# Patient Record
Sex: MALE | Race: WHITE | NOT HISPANIC OR LATINO | Employment: FULL TIME | URBAN - METROPOLITAN AREA
[De-identification: names, ages, dates, MRNs, and addresses within clinical notes are randomized per-mention and may not be internally consistent; named-entity substitution may affect disease eponyms.]

---

## 2018-04-24 ENCOUNTER — RECORDS - HEALTHEAST (OUTPATIENT)
Dept: LAB | Facility: CLINIC | Age: 58
End: 2018-04-24

## 2018-04-25 LAB
ALBUMIN SERPL-MCNC: 4.2 G/DL (ref 3.5–5)
ALP SERPL-CCNC: 78 U/L (ref 45–120)
ALT SERPL W P-5'-P-CCNC: 30 U/L (ref 0–45)
ANION GAP SERPL CALCULATED.3IONS-SCNC: 14 MMOL/L (ref 5–18)
AST SERPL W P-5'-P-CCNC: 31 U/L (ref 0–40)
BASOPHILS # BLD AUTO: 0.1 THOU/UL (ref 0–0.2)
BASOPHILS NFR BLD AUTO: 1 % (ref 0–2)
BILIRUB SERPL-MCNC: 1 MG/DL (ref 0–1)
BUN SERPL-MCNC: 13 MG/DL (ref 8–22)
CALCIUM SERPL-MCNC: 9.1 MG/DL (ref 8.5–10.5)
CHLORIDE BLD-SCNC: 106 MMOL/L (ref 98–107)
CHOLEST SERPL-MCNC: 189 MG/DL
CO2 SERPL-SCNC: 21 MMOL/L (ref 22–31)
CREAT SERPL-MCNC: 0.85 MG/DL (ref 0.7–1.3)
EOSINOPHIL # BLD AUTO: 0.2 THOU/UL (ref 0–0.4)
EOSINOPHIL NFR BLD AUTO: 4 % (ref 0–6)
ERYTHROCYTE [DISTWIDTH] IN BLOOD BY AUTOMATED COUNT: 12.1 % (ref 11–14.5)
FASTING STATUS PATIENT QL REPORTED: ABNORMAL
GFR SERPL CREATININE-BSD FRML MDRD: >60 ML/MIN/1.73M2
GLUCOSE BLD-MCNC: 73 MG/DL (ref 70–125)
HCT VFR BLD AUTO: 46.9 % (ref 40–54)
HDLC SERPL-MCNC: 40 MG/DL
HGB BLD-MCNC: 15.8 G/DL (ref 14–18)
LDLC SERPL CALC-MCNC: 112 MG/DL
LYMPHOCYTES # BLD AUTO: 2 THOU/UL (ref 0.8–4.4)
LYMPHOCYTES NFR BLD AUTO: 31 % (ref 20–40)
MCH RBC QN AUTO: 31.9 PG (ref 27–34)
MCHC RBC AUTO-ENTMCNC: 33.7 G/DL (ref 32–36)
MCV RBC AUTO: 95 FL (ref 80–100)
MONOCYTES # BLD AUTO: 0.6 THOU/UL (ref 0–0.9)
MONOCYTES NFR BLD AUTO: 10 % (ref 2–10)
NEUTROPHILS # BLD AUTO: 3.4 THOU/UL (ref 2–7.7)
NEUTROPHILS NFR BLD AUTO: 54 % (ref 50–70)
PLATELET # BLD AUTO: 254 THOU/UL (ref 140–440)
PMV BLD AUTO: 10.5 FL (ref 8.5–12.5)
POTASSIUM BLD-SCNC: 4 MMOL/L (ref 3.5–5)
PROT SERPL-MCNC: 7 G/DL (ref 6–8)
RBC # BLD AUTO: 4.96 MILL/UL (ref 4.4–6.2)
SODIUM SERPL-SCNC: 141 MMOL/L (ref 136–145)
TRIGL SERPL-MCNC: 187 MG/DL
TSH SERPL DL<=0.005 MIU/L-ACNC: 0.73 UIU/ML (ref 0.3–5)
WBC: 6.3 THOU/UL (ref 4–11)

## 2019-11-15 ENCOUNTER — RECORDS - HEALTHEAST (OUTPATIENT)
Dept: LAB | Facility: CLINIC | Age: 59
End: 2019-11-15

## 2019-11-15 LAB
ALBUMIN SERPL-MCNC: 4.2 G/DL (ref 3.5–5)
ALP SERPL-CCNC: 83 U/L (ref 45–120)
ALT SERPL W P-5'-P-CCNC: 33 U/L (ref 0–45)
ANION GAP SERPL CALCULATED.3IONS-SCNC: 10 MMOL/L (ref 5–18)
AST SERPL W P-5'-P-CCNC: 28 U/L (ref 0–40)
BILIRUB SERPL-MCNC: 1.1 MG/DL (ref 0–1)
BUN SERPL-MCNC: 17 MG/DL (ref 8–22)
CALCIUM SERPL-MCNC: 9 MG/DL (ref 8.5–10.5)
CHLORIDE BLD-SCNC: 108 MMOL/L (ref 98–107)
CHOLEST SERPL-MCNC: 200 MG/DL
CO2 SERPL-SCNC: 22 MMOL/L (ref 22–31)
CREAT SERPL-MCNC: 0.95 MG/DL (ref 0.7–1.3)
FASTING STATUS PATIENT QL REPORTED: ABNORMAL
GFR SERPL CREATININE-BSD FRML MDRD: >60 ML/MIN/1.73M2
GLUCOSE BLD-MCNC: 156 MG/DL (ref 70–125)
HDLC SERPL-MCNC: 42 MG/DL
LDLC SERPL CALC-MCNC: 127 MG/DL
POTASSIUM BLD-SCNC: 3.8 MMOL/L (ref 3.5–5)
PROT SERPL-MCNC: 6.7 G/DL (ref 6–8)
PSA SERPL-MCNC: 0.3 NG/ML (ref 0–3.5)
SODIUM SERPL-SCNC: 140 MMOL/L (ref 136–145)
TRIGL SERPL-MCNC: 155 MG/DL
TSH SERPL DL<=0.005 MIU/L-ACNC: 0.49 UIU/ML (ref 0.3–5)

## 2019-11-22 ENCOUNTER — RECORDS - HEALTHEAST (OUTPATIENT)
Dept: LAB | Facility: CLINIC | Age: 59
End: 2019-11-22

## 2019-11-24 LAB — HBA1C MFR BLD: 5.3 % (ref 4.2–6.1)

## 2020-11-16 ENCOUNTER — RECORDS - HEALTHEAST (OUTPATIENT)
Dept: LAB | Facility: CLINIC | Age: 60
End: 2020-11-16

## 2020-11-17 LAB
ALBUMIN SERPL-MCNC: 4.5 G/DL (ref 3.5–5)
ALP SERPL-CCNC: 99 U/L (ref 45–120)
ALT SERPL W P-5'-P-CCNC: 35 U/L (ref 0–45)
ANION GAP SERPL CALCULATED.3IONS-SCNC: 11 MMOL/L (ref 5–18)
AST SERPL W P-5'-P-CCNC: 30 U/L (ref 0–40)
BILIRUB SERPL-MCNC: 0.7 MG/DL (ref 0–1)
BUN SERPL-MCNC: 17 MG/DL (ref 8–22)
CALCIUM SERPL-MCNC: 9.1 MG/DL (ref 8.5–10.5)
CHLORIDE BLD-SCNC: 109 MMOL/L (ref 98–107)
CHOLEST SERPL-MCNC: 203 MG/DL
CO2 SERPL-SCNC: 24 MMOL/L (ref 22–31)
CREAT SERPL-MCNC: 1.03 MG/DL (ref 0.7–1.3)
FASTING STATUS PATIENT QL REPORTED: NO
GFR SERPL CREATININE-BSD FRML MDRD: >60 ML/MIN/1.73M2
GLUCOSE BLD-MCNC: 79 MG/DL (ref 70–125)
HDLC SERPL-MCNC: 39 MG/DL
LDLC SERPL CALC-MCNC: 144 MG/DL
LDLC SERPL CALC-MCNC: ABNORMAL MG/DL
POTASSIUM BLD-SCNC: 4.2 MMOL/L (ref 3.5–5)
PROT SERPL-MCNC: 7.1 G/DL (ref 6–8)
PSA SERPL-MCNC: 0.3 NG/ML (ref 0–3.5)
SODIUM SERPL-SCNC: 144 MMOL/L (ref 136–145)
TRIGL SERPL-MCNC: 410 MG/DL

## 2021-05-28 ENCOUNTER — RECORDS - HEALTHEAST (OUTPATIENT)
Dept: ADMINISTRATIVE | Facility: CLINIC | Age: 61
End: 2021-05-28

## 2021-12-06 ENCOUNTER — APPOINTMENT (OUTPATIENT)
Dept: CT IMAGING | Facility: HOSPITAL | Age: 61
DRG: 177 | End: 2021-12-06
Attending: EMERGENCY MEDICINE
Payer: COMMERCIAL

## 2021-12-06 ENCOUNTER — HOSPITAL ENCOUNTER (INPATIENT)
Facility: HOSPITAL | Age: 61
LOS: 2 days | Discharge: ANOTHER HOSPITAL | DRG: 177 | End: 2021-12-08
Attending: EMERGENCY MEDICINE | Admitting: INTERNAL MEDICINE
Payer: COMMERCIAL

## 2021-12-06 DIAGNOSIS — U07.1 INFECTION DUE TO 2019 NOVEL CORONAVIRUS: ICD-10-CM

## 2021-12-06 DIAGNOSIS — R09.02 HYPOXIA: ICD-10-CM

## 2021-12-06 DIAGNOSIS — R06.02 SOB (SHORTNESS OF BREATH): ICD-10-CM

## 2021-12-06 LAB
ABO/RH(D): NORMAL
ALBUMIN SERPL-MCNC: 3 G/DL (ref 3.5–5)
ALP SERPL-CCNC: 47 U/L (ref 45–120)
ALT SERPL W P-5'-P-CCNC: 68 U/L (ref 0–45)
ANION GAP SERPL CALCULATED.3IONS-SCNC: 11 MMOL/L (ref 5–18)
APTT PPP: 36 SECONDS (ref 22–38)
AST SERPL W P-5'-P-CCNC: 96 U/L (ref 0–40)
BASOPHILS # BLD AUTO: 0 10E3/UL (ref 0–0.2)
BASOPHILS NFR BLD AUTO: 1 %
BILIRUB SERPL-MCNC: 0.9 MG/DL (ref 0–1)
BNP SERPL-MCNC: 31 PG/ML (ref 0–53)
BUN SERPL-MCNC: 21 MG/DL (ref 8–22)
C REACTIVE PROTEIN LHE: 7.1 MG/DL (ref 0–0.8)
CALCIUM SERPL-MCNC: 8.3 MG/DL (ref 8.5–10.5)
CHLORIDE BLD-SCNC: 96 MMOL/L (ref 98–107)
CK SERPL-CCNC: 389 U/L (ref 30–190)
CO2 SERPL-SCNC: 26 MMOL/L (ref 22–31)
CREAT SERPL-MCNC: 0.98 MG/DL (ref 0.7–1.3)
D DIMER PPP FEU-MCNC: 0.99 UG/ML FEU (ref 0–0.5)
EOSINOPHIL # BLD AUTO: 0 10E3/UL (ref 0–0.7)
EOSINOPHIL NFR BLD AUTO: 0 %
ERYTHROCYTE [DISTWIDTH] IN BLOOD BY AUTOMATED COUNT: 11.9 % (ref 10–15)
FIBRINOGEN PPP-MCNC: 493 MG/DL (ref 170–490)
FLUAV RNA SPEC QL NAA+PROBE: NEGATIVE
FLUBV RNA RESP QL NAA+PROBE: NEGATIVE
GFR SERPL CREATININE-BSD FRML MDRD: 83 ML/MIN/1.73M2
GLUCOSE BLD-MCNC: 97 MG/DL (ref 70–125)
HCT VFR BLD AUTO: 48 % (ref 40–53)
HGB BLD-MCNC: 16.8 G/DL (ref 13.3–17.7)
IMM GRANULOCYTES # BLD: 0 10E3/UL
IMM GRANULOCYTES NFR BLD: 1 %
INR PPP: 0.95 (ref 0.85–1.15)
LDH SERPL L TO P-CCNC: 738 U/L (ref 125–220)
LYMPHOCYTES # BLD AUTO: 0.6 10E3/UL (ref 0.8–5.3)
LYMPHOCYTES NFR BLD AUTO: 12 %
MCH RBC QN AUTO: 31.3 PG (ref 26.5–33)
MCHC RBC AUTO-ENTMCNC: 35 G/DL (ref 31.5–36.5)
MCV RBC AUTO: 90 FL (ref 78–100)
MONOCYTES # BLD AUTO: 0.4 10E3/UL (ref 0–1.3)
MONOCYTES NFR BLD AUTO: 7 %
NEUTROPHILS # BLD AUTO: 4.1 10E3/UL (ref 1.6–8.3)
NEUTROPHILS NFR BLD AUTO: 79 %
NRBC # BLD AUTO: 0 10E3/UL
NRBC BLD AUTO-RTO: 0 /100
PLAT MORPH BLD: ABNORMAL
PLATELET # BLD AUTO: ABNORMAL 10*3/UL
POTASSIUM BLD-SCNC: 3.7 MMOL/L (ref 3.5–5)
PROCALCITONIN SERPL-MCNC: 0.18 NG/ML (ref 0–0.49)
PROT SERPL-MCNC: 6.1 G/DL (ref 6–8)
RBC # BLD AUTO: 5.36 10E6/UL (ref 4.4–5.9)
RBC MORPH BLD: ABNORMAL
SARS-COV-2 RNA RESP QL NAA+PROBE: POSITIVE
SODIUM SERPL-SCNC: 133 MMOL/L (ref 136–145)
SPECIMEN EXPIRATION DATE: NORMAL
TROPONIN I SERPL-MCNC: 0.02 NG/ML (ref 0–0.29)
TROPONIN I SERPL-MCNC: 0.03 NG/ML (ref 0–0.29)
WBC # BLD AUTO: 5.2 10E3/UL (ref 4–11)

## 2021-12-06 PROCEDURE — XW0DXM6 INTRODUCTION OF BARICITINIB INTO MOUTH AND PHARYNX, EXTERNAL APPROACH, NEW TECHNOLOGY GROUP 6: ICD-10-PCS | Performed by: FAMILY MEDICINE

## 2021-12-06 PROCEDURE — 96372 THER/PROPH/DIAG INJ SC/IM: CPT

## 2021-12-06 PROCEDURE — 36415 COLL VENOUS BLD VENIPUNCTURE: CPT | Performed by: FAMILY MEDICINE

## 2021-12-06 PROCEDURE — 99285 EMERGENCY DEPT VISIT HI MDM: CPT | Mod: 25

## 2021-12-06 PROCEDURE — 96361 HYDRATE IV INFUSION ADD-ON: CPT

## 2021-12-06 PROCEDURE — 84145 PROCALCITONIN (PCT): CPT | Performed by: EMERGENCY MEDICINE

## 2021-12-06 PROCEDURE — 84484 ASSAY OF TROPONIN QUANT: CPT | Performed by: EMERGENCY MEDICINE

## 2021-12-06 PROCEDURE — 36415 COLL VENOUS BLD VENIPUNCTURE: CPT | Performed by: EMERGENCY MEDICINE

## 2021-12-06 PROCEDURE — 258N000003 HC RX IP 258 OP 636: Performed by: FAMILY MEDICINE

## 2021-12-06 PROCEDURE — 250N000013 HC RX MED GY IP 250 OP 250 PS 637: Performed by: FAMILY MEDICINE

## 2021-12-06 PROCEDURE — 86900 BLOOD TYPING SEROLOGIC ABO: CPT | Performed by: FAMILY MEDICINE

## 2021-12-06 PROCEDURE — 85730 THROMBOPLASTIN TIME PARTIAL: CPT | Performed by: FAMILY MEDICINE

## 2021-12-06 PROCEDURE — 120N000001 HC R&B MED SURG/OB

## 2021-12-06 PROCEDURE — 999N000157 HC STATISTIC RCP TIME EA 10 MIN

## 2021-12-06 PROCEDURE — 87636 SARSCOV2 & INF A&B AMP PRB: CPT | Performed by: EMERGENCY MEDICINE

## 2021-12-06 PROCEDURE — 272N000054 HC CANNULA HIGH FLOW, ADULT

## 2021-12-06 PROCEDURE — 80053 COMPREHEN METABOLIC PANEL: CPT | Performed by: EMERGENCY MEDICINE

## 2021-12-06 PROCEDURE — 250N000011 HC RX IP 250 OP 636: Performed by: EMERGENCY MEDICINE

## 2021-12-06 PROCEDURE — 96374 THER/PROPH/DIAG INJ IV PUSH: CPT | Mod: 59

## 2021-12-06 PROCEDURE — 250N000011 HC RX IP 250 OP 636: Performed by: INTERNAL MEDICINE

## 2021-12-06 PROCEDURE — 71275 CT ANGIOGRAPHY CHEST: CPT

## 2021-12-06 PROCEDURE — 85610 PROTHROMBIN TIME: CPT | Performed by: FAMILY MEDICINE

## 2021-12-06 PROCEDURE — 83615 LACTATE (LD) (LDH) ENZYME: CPT | Performed by: FAMILY MEDICINE

## 2021-12-06 PROCEDURE — 84484 ASSAY OF TROPONIN QUANT: CPT | Performed by: FAMILY MEDICINE

## 2021-12-06 PROCEDURE — 85384 FIBRINOGEN ACTIVITY: CPT | Performed by: FAMILY MEDICINE

## 2021-12-06 PROCEDURE — 999N000215 HC STATISTIC HFNC ADULT NON-CPAP

## 2021-12-06 PROCEDURE — 86141 C-REACTIVE PROTEIN HS: CPT | Performed by: EMERGENCY MEDICINE

## 2021-12-06 PROCEDURE — 85048 AUTOMATED LEUKOCYTE COUNT: CPT | Performed by: EMERGENCY MEDICINE

## 2021-12-06 PROCEDURE — C9803 HOPD COVID-19 SPEC COLLECT: HCPCS

## 2021-12-06 PROCEDURE — 82550 ASSAY OF CK (CPK): CPT | Performed by: FAMILY MEDICINE

## 2021-12-06 PROCEDURE — 99223 1ST HOSP IP/OBS HIGH 75: CPT | Performed by: FAMILY MEDICINE

## 2021-12-06 PROCEDURE — 93005 ELECTROCARDIOGRAM TRACING: CPT | Performed by: EMERGENCY MEDICINE

## 2021-12-06 PROCEDURE — 85379 FIBRIN DEGRADATION QUANT: CPT | Performed by: EMERGENCY MEDICINE

## 2021-12-06 PROCEDURE — 83880 ASSAY OF NATRIURETIC PEPTIDE: CPT | Performed by: EMERGENCY MEDICINE

## 2021-12-06 RX ORDER — ACETAMINOPHEN 325 MG/1
650 TABLET ORAL EVERY 6 HOURS PRN
Status: DISCONTINUED | OUTPATIENT
Start: 2021-12-06 | End: 2021-12-08 | Stop reason: HOSPADM

## 2021-12-06 RX ORDER — ACETAMINOPHEN 650 MG/1
650 SUPPOSITORY RECTAL EVERY 6 HOURS PRN
Status: DISCONTINUED | OUTPATIENT
Start: 2021-12-06 | End: 2021-12-08 | Stop reason: HOSPADM

## 2021-12-06 RX ORDER — IOPAMIDOL 755 MG/ML
100 INJECTION, SOLUTION INTRAVASCULAR ONCE
Status: COMPLETED | OUTPATIENT
Start: 2021-12-06 | End: 2021-12-06

## 2021-12-06 RX ORDER — ONDANSETRON 2 MG/ML
4 INJECTION INTRAMUSCULAR; INTRAVENOUS EVERY 6 HOURS PRN
Status: DISCONTINUED | OUTPATIENT
Start: 2021-12-06 | End: 2021-12-08 | Stop reason: HOSPADM

## 2021-12-06 RX ORDER — PROCHLORPERAZINE 25 MG
25 SUPPOSITORY, RECTAL RECTAL EVERY 12 HOURS PRN
Status: DISCONTINUED | OUTPATIENT
Start: 2021-12-06 | End: 2021-12-08 | Stop reason: HOSPADM

## 2021-12-06 RX ORDER — POLYETHYLENE GLYCOL 3350 17 G/17G
17 POWDER, FOR SOLUTION ORAL DAILY PRN
Status: DISCONTINUED | OUTPATIENT
Start: 2021-12-06 | End: 2021-12-08 | Stop reason: HOSPADM

## 2021-12-06 RX ORDER — ONDANSETRON 4 MG/1
4 TABLET, ORALLY DISINTEGRATING ORAL EVERY 6 HOURS PRN
Status: DISCONTINUED | OUTPATIENT
Start: 2021-12-06 | End: 2021-12-08 | Stop reason: HOSPADM

## 2021-12-06 RX ORDER — FAMOTIDINE 20 MG/1
20 TABLET, FILM COATED ORAL 2 TIMES DAILY
Status: DISCONTINUED | OUTPATIENT
Start: 2021-12-06 | End: 2021-12-07

## 2021-12-06 RX ORDER — LIDOCAINE 40 MG/G
CREAM TOPICAL
Status: DISCONTINUED | OUTPATIENT
Start: 2021-12-06 | End: 2021-12-08 | Stop reason: HOSPADM

## 2021-12-06 RX ORDER — DEXAMETHASONE SODIUM PHOSPHATE 10 MG/ML
6 INJECTION, SOLUTION INTRAMUSCULAR; INTRAVENOUS ONCE
Status: COMPLETED | OUTPATIENT
Start: 2021-12-06 | End: 2021-12-06

## 2021-12-06 RX ORDER — PROCHLORPERAZINE MALEATE 10 MG
10 TABLET ORAL EVERY 6 HOURS PRN
Status: DISCONTINUED | OUTPATIENT
Start: 2021-12-06 | End: 2021-12-08 | Stop reason: HOSPADM

## 2021-12-06 RX ADMIN — SODIUM CHLORIDE 500 ML: 9 INJECTION, SOLUTION INTRAVENOUS at 21:36

## 2021-12-06 RX ADMIN — IOPAMIDOL 100 ML: 755 INJECTION, SOLUTION INTRAVENOUS at 17:41

## 2021-12-06 RX ADMIN — ENOXAPARIN SODIUM 50 MG: 100 INJECTION SUBCUTANEOUS at 21:35

## 2021-12-06 RX ADMIN — FAMOTIDINE 20 MG: 20 TABLET, FILM COATED ORAL at 21:36

## 2021-12-06 RX ADMIN — BARICITINIB 4 MG: 2 TABLET, FILM COATED ORAL at 20:55

## 2021-12-06 RX ADMIN — DEXAMETHASONE SODIUM PHOSPHATE 6 MG: 10 INJECTION INTRAMUSCULAR; INTRAVENOUS at 14:07

## 2021-12-06 ASSESSMENT — ENCOUNTER SYMPTOMS
TROUBLE SWALLOWING: 0
COUGH: 1
DIARRHEA: 0
DYSURIA: 0
FEVER: 1
SHORTNESS OF BREATH: 1
NAUSEA: 0
VOMITING: 0
ABDOMINAL PAIN: 0

## 2021-12-06 ASSESSMENT — ACTIVITIES OF DAILY LIVING (ADL)
ADLS_ACUITY_SCORE: 8

## 2021-12-06 NOTE — ED PROVIDER NOTES
EMERGENCY DEPARTMENT ENCOUNTER      NAME: Juan June  AGE: 61 year old male  YOB: 1960  MRN: [id number]  EVALUATION DATE & TIME: 2021 12:39 PM    PCP: Monroe Izquierdo    ED PROVIDER: Sandhya Wagner M.D.        Chief Complaint   Patient presents with     Shortness of Breath     Covid Concern         FINAL IMPRESSION:    1. Infection due to 2019 novel coronavirus    2. Hypoxia    3. SOB (shortness of breath)            MEDICAL DECISION MAKIN year old male who denies any past medical history who presents emergency department with hypoxia in the setting of Covid.  He is not vaccinated.  Oxygen saturations in the 80s here upon arrival.  He is on high flow oxygen at 60% and self proning with sats in the mid to upper 90s.  Laboratories consistent with Covid.  CT scan pending at time of signout to my partner and awaiting disposition.  Decadron and remdesivir have been ordered.        ED COURSE:  12:49 PM  I met with the patient to gather history and perform my exam. ED course and treatment discussed.  Patient on 15 L by oxygen mask and satting 92-93%.  No respiratory distress.  We will continue to monitor his oxygen saturations.  Decadron ordered from triage.  We will see if he qualifies for remdesivir.  The meantime we will do CT imaging and plan for admission to the hospital.    3:37 PM  Patient does qualify for remdesivir and this has been ordered.  Patient signed out at change of shift awaiting to Dr. Cordero awaiting CT scan results and admission.  He is currently on high flow oxygen at 60% and had improvement in oxygenation with self proning.  I do not think that this represents rib fractures, allergic reaction, COPD exacerbation, asthma exacerbation, CHF, myocarditis, pericarditis, endocarditis, ACS, PE, ruptured AAA, pneumothorax, aortic dissection, bowel obstruction, or other such etiologies at this time.      COVID-19 PPE worn during patient evaluation:  Mask: n95 and  homemade masks  Eye Protection: goggles   Gown: reusable   Hair cover: yes  Face shield: yes   Patient wearing a mask: yes    At the conclusion of the encounter I discussed the results of all of the tests and the disposition. Their questions were answered. The patient (and any family present) acknowledged understanding and were agreeable with the care plan.        30 minutes of critical care time has been spent in direct patient care, laboratory review, review of previous medical records, and in discussion with admitting physician and consultant(s).  None of this time was spent in procedures.          CONSULTANTS:  none          MEDICATIONS GIVEN IN THE EMERGENCY:  Medications   remdesivir 200 mg in sodium chloride 0.9 % 250 mL intermittent infusion (has no administration in time range)     Followed by   0.9% sodium chloride BOLUS (has no administration in time range)   remdesivir 100 mg in sodium chloride 0.9 % 250 mL intermittent infusion (has no administration in time range)     And   0.9% sodium chloride BOLUS (has no administration in time range)   dexamethasone PF (DECADRON) injection 6 mg (6 mg Intravenous Given 12/6/21 1407)             NEW PRESCRIPTIONS STARTED AT TODAY'S ER VISIT     Medication List      There are no discharge medications for this visit.             CONDITION:  stable        DISPOSITION:  Pending admission         =================================================================  =================================================================    HPI    Patient information was obtained from: patient    Use of Intrepreter: N/A     Juan June is a 61 year old male who denies any past medical history who presents to the ER with complaints of SOB and COVID.    Patient reports that he tested positive for Covid 1 week ago.  Comes in with increasing shortness of breath, fevers, cough.  He has been checking her oxygen level at home and it was 85% on room air.  Found to have oxygen saturation  "of 85% here in triage and placed on oxygen and moved back to a room urgently.    He denies any actual chest pain, abdominal pain, vomiting, diarrhea, dysuria, hematuria.    He is not Covid vaccinated.    Denies any past medical history and states he last saw primary care about 1 year ago for routine physical.  Denies any daily medications.  He reports an allergy to penicillins for which she reported \"they made me sick as a child \".      REVIEW OF SYSTEMS  Review of Systems   Constitutional: Positive for fever.   HENT: Negative for trouble swallowing.    Respiratory: Positive for cough and shortness of breath.    Cardiovascular: Negative for chest pain.   Gastrointestinal: Negative for abdominal pain, diarrhea, nausea and vomiting.   Genitourinary: Negative for dysuria.   All other systems reviewed and are negative.        PAST MEDICAL HISTORY:  No past medical history on file.      PAST SURGICAL HISTORY:  No past surgical history on file.      CURRENT MEDICATIONS:    Prior to Admission medications    Medication Sig Start Date End Date Taking? Authorizing Provider   FLONASE INHA 50 MCG/DOSE NA 2 SPRAYS IN EACH NOSTRIL QD (Once per day)       IBUPROFEN 200 200 MG OR TABS 4 TABLETs   EVERY 4 TO 6 HOURS AS NEEDED       LIQUIBID 1200 1200 MG OR TB12 1 TABLET EVERY 12 HOURS AS NEEDED 4/21/03            ALLERGIES:  Allergies   Allergen Reactions     Penicillins      \"?\" as child         FAMILY HISTORY:  Family History   Problem Relation Age of Onset     Hypertension Mother         born 1937     Lipids Father         born 1933     Family History Negative Brother          SOCIAL HISTORY:  Social History     Socioeconomic History     Marital status: Single     Spouse name: Not on file     Number of children: Not on file     Years of education: Not on file     Highest education level: Not on file   Occupational History     Not on file   Tobacco Use     Smoking status: Former Smoker     Quit date: 4/21/1978     Years since " quittin.6     Smokeless tobacco: Not on file   Substance and Sexual Activity     Alcohol use: Yes     Comment: rare     Drug use: Not on file     Sexual activity: Not on file   Other Topics Concern     Not on file   Social History Narrative     Not on file     Social Determinants of Health     Financial Resource Strain: Not on file   Food Insecurity: Not on file   Transportation Needs: Not on file   Physical Activity: Not on file   Stress: Not on file   Social Connections: Not on file   Intimate Partner Violence: Not on file   Housing Stability: Not on file         VITALS:  Patient Vitals for the past 24 hrs:   BP Temp Temp src Pulse Resp SpO2 Weight   21 1534 119/67 -- -- 68 24 93 % --   21 1515 125/67 -- -- 59 20 94 % --   21 1500 107/67 -- -- 64 29 90 % --   21 1445 122/71 -- -- 63 -- -- --   21 1430 125/74 -- -- 62 19 90 % --   21 1415 118/67 -- -- 60 -- -- --   21 1400 122/70 -- -- 60 -- -- --   21 1345 122/69 -- -- 60 17 93 % --   21 1331 -- -- -- -- -- 93 % --   21 1330 121/76 -- -- 63 -- -- --   21 1315 123/78 -- -- 62 -- 90 % --   21 1300 124/72 -- -- 64 (!) 35 93 % --   21 1257 122/70 -- -- 60 (!) 35 93 % --   21 1253 -- -- -- -- -- 92 % --   21 1245 125/66 -- -- 61 24 (!) 88 % --   21 1244 -- -- -- 60 -- (!) 87 % --   21 1226 -- -- -- -- -- -- 90.7 kg (200 lb)   21 1218 121/69 98.2  F (36.8  C) Temporal 58 18 (!) 84 % --       Wt Readings from Last 3 Encounters:   21 90.7 kg (200 lb)   03 87.5 kg (193 lb)         PHYSICAL EXAM    Constitutional:  Well developed, Well nourished, NAD, GCS 15  HENT:  Normocephalic, Atraumatic, Bilateral external ears normal, Nose normal. Neck-  Normal range of motion, No tenderness, Supple, No stridor.   Eyes:  PERRL, EOMI, Conjunctiva normal, No discharge.  Respiratory:  Normal breath sounds, No respiratory distress, No wheezing, Speaks full sentences  easily. Does not appear to be in any respiratory distress. No cough.   Cardiovascular:  Normal heart rate, Regular rhythm, No murmurs, No rubs, No gallops. Chest wall nontender.   GI:  No excessive obesity.  Bowel sounds normal, Soft, No tenderness, No masses, No flank tenderness. No rebound or guarding.   : deferred  Musculoskeletal:  No cyanosis, No clubbing. Good range of motion in all major joints. No major deformities noted.   Integument:  Warm, Dry, No erythema, No rash.  No petechiae.   Neurologic:  Alert & oriented x 3, No focal deficits noted.   Psychiatric:  Affect normal, Cooperative         LAB:  All pertinent labs reviewed and interpreted.  Recent Results (from the past 24 hour(s))   Troponin I (now)    Collection Time: 12/06/21  1:03 PM   Result Value Ref Range    Troponin I 0.02 0.00 - 0.29 ng/mL   B-Type Natriuretic Peptide ([facility] Only)    Collection Time: 12/06/21  1:03 PM   Result Value Ref Range    BNP 31 0 - 53 pg/mL   D dimer quantitative    Collection Time: 12/06/21  1:03 PM   Result Value Ref Range    D-Dimer Quantitative 0.99 (H) 0.00 - 0.50 ug/mL FEU   Procalcitonin    Collection Time: 12/06/21  1:03 PM   Result Value Ref Range    Procalcitonin 0.18 0.00 - 0.49 ng/mL   CBC with platelets and differential    Collection Time: 12/06/21  1:03 PM   Result Value Ref Range    WBC Count 5.2 4.0 - 11.0 10e3/uL    RBC Count 5.36 4.40 - 5.90 10e6/uL    Hemoglobin 16.8 13.3 - 17.7 g/dL    Hematocrit 48.0 40.0 - 53.0 %    MCV 90 78 - 100 fL    MCH 31.3 26.5 - 33.0 pg    MCHC 35.0 31.5 - 36.5 g/dL    RDW 11.9 10.0 - 15.0 %    Platelet Count     RBC and Platelet Morphology    Collection Time: 12/06/21  1:03 PM   Result Value Ref Range    Platelet Assessment Platelets Clumped (A) Automated Count Confirmed. Platelet morphology is normal.    RBC Morphology Confirmed RBC Indices    Symptomatic Influenza A/B & SARS-CoV2 (COVID-19) Virus PCR Multiplex Nasopharyngeal    Collection Time: 12/06/21  1:05 PM     Specimen: Nasopharyngeal; Swab   Result Value Ref Range    Influenza A PCR Negative Negative    Influenza B PCR Negative Negative    SARS CoV2 PCR Positive (A) Negative   CRP inflammation    Collection Time: 12/06/21  2:07 PM   Result Value Ref Range    CRP 7.1 (H) 0.0-<0.8 mg/dL   Comprehensive metabolic panel    Collection Time: 12/06/21  2:57 PM   Result Value Ref Range    Sodium 133 (L) 136 - 145 mmol/L    Potassium 3.7 3.5 - 5.0 mmol/L    Chloride 96 (L) 98 - 107 mmol/L    Carbon Dioxide (CO2) 26 22 - 31 mmol/L    Anion Gap 11 5 - 18 mmol/L    Urea Nitrogen 21 8 - 22 mg/dL    Creatinine 0.98 0.70 - 1.30 mg/dL    Calcium 8.3 (L) 8.5 - 10.5 mg/dL    Glucose 97 70 - 125 mg/dL    Alkaline Phosphatase 47 45 - 120 U/L    AST 96 (H) 0 - 40 U/L    ALT 68 (H) 0 - 45 U/L    Protein Total 6.1 6.0 - 8.0 g/dL    Albumin 3.0 (L) 3.5 - 5.0 g/dL    Bilirubin Total 0.9 0.0 - 1.0 mg/dL    GFR Estimate 83 >60 mL/min/1.73m2       No results found for: ABORH        RADIOLOGY:  Reviewed all pertinent imaging. Please see official radiology report.    CT Chest Pulmonary Embolism w Contrast    (Results Pending)         EKG:    Performed at: 13:06p    Impression: Sinus bradycardia at 59 bpm.  Flipped T waves noted in lead III, aVR, and V1.  RI interval 144 ms, QRS 90 ms,  ms.  No prior EKGs to compare to.    I have independently reviewed and interpreted the EKG(s) documented above.        PROCEDURES:  None      Sandhya Wagner M.D. [facility]  Emergency Medicine and Medical Toxicology  Formerly [facility] EMERGENCY DEPARTMENT  [address] [id number] [phone]  Dept: [phone]           Sandhya Wagner MD  12/06/21 0059

## 2021-12-06 NOTE — ED PROVIDER NOTES
ED Triage Provider Note  [facility]  Encounter Date: Dec 6, 2021    History:  No chief complaint on file.    Juan June is a 61 year old male who presents to the ED with shortness of breath. Home oximeter reading 85 percent. COVID positive 7 days ago. Fever at home. Ibuprofen helps with fever (7AM). Symptoms started 10 days ago. No tobacco use or vaping.    Not vaccinated. Bought ivermectin has not taken.       Review of Systems:  fever    Exam:  There were no vitals taken for this visit.    Vitals: /69   Pulse 58   Temp 98.2  F (36.8  C) (Temporal)   Resp 18   SpO2 (!) 84%   General: Appears in no acute distress, awake, alert, interactive.  Eyes: Conjunctivae non-injected. Sclera anicteric.  HENT: Atraumatic.  Neck: Supple.  Respiratory/Chest: Respiration unlabored.  Abdomen: non distended  Musculoskeletal: Normal extremities. No edema or erythema.  Skin: Normal color. No rash or diaphoresis.  Neurologic: Face symmetric, moves all extremities spontaneously. Speech clear.  Psychiatric: Oriented to person, place, and time. Affect appropriate.      Medical Decision Making:  Patient arriving to the ED with problem as above. A medical screening exam was performed. orders initiated from Triage. Needs ER bed evaluation.       Galdino Joyner MD on 12/6/2021 at 12:17 PM       Galdino Joyner MD  12/06/21 6721

## 2021-12-06 NOTE — PROGRESS NOTES
Patient found on 15lpm Oxymask Sao2 88% Placed on high flow NC 50lpm and 50% Sao2 increased to 93%  Chuy Oneal, RT

## 2021-12-06 NOTE — PROGRESS NOTES
Patient increased to high flow NC 50lpm and 60% Patient self prone Sao2 increased to 95%  Chuy Oneal, RT

## 2021-12-06 NOTE — ED TRIAGE NOTES
Pt positive for COVID 1 week ago. Pt sat at home 85%. O2 in triage is 85% placed on nasal cannula. 94% on 2 L

## 2021-12-06 NOTE — ED NOTES
Received report from Pat MCNAMARA. Patient is self proning at this time on high flow NC. Sating at 92-93%. Needs assessed, none identified. Will continue to monitor.

## 2021-12-06 NOTE — ED PROVIDER NOTES
ED SIGNOUT  Date/Time:12/6/2021 3:48 PM    Patient signed out to me by my colleague, Sandhya Wagner MD.  Please see their note for complete history and physical. Plan to follow up on pending CT results     The creation of this record is based on the scribe s observations of the work being performed by Misael Mcclain and the provider s statements to them. It was created on their behalf by Misael Mcclain a trained medical scribe. This document has been checked and approved by the attending provider.      REMAINING ED WORKUP:    Vitals:  /67   Pulse 68   Temp 98.2  F (36.8  C) (Temporal)   Resp 24   Wt 90.7 kg (200 lb)   SpO2 93%       Pertinent labs results reviewed   Results for orders placed or performed during the hospital encounter of 12/06/21   CT Chest Pulmonary Embolism w Contrast    Impression    IMPRESSION:  1.  Limited exam due to respiratory motion. No central or definite peripheral pulmonary artery embolism.  2.  Moderate to severe bilateral multilobar airspace disease consistent with COVID 19 pneumonia. No pleural effusion.   Comprehensive metabolic panel   Result Value Ref Range    Sodium 133 (L) 136 - 145 mmol/L    Potassium 3.7 3.5 - 5.0 mmol/L    Chloride 96 (L) 98 - 107 mmol/L    Carbon Dioxide (CO2) 26 22 - 31 mmol/L    Anion Gap 11 5 - 18 mmol/L    Urea Nitrogen 21 8 - 22 mg/dL    Creatinine 0.98 0.70 - 1.30 mg/dL    Calcium 8.3 (L) 8.5 - 10.5 mg/dL    Glucose 97 70 - 125 mg/dL    Alkaline Phosphatase 47 45 - 120 U/L    AST 96 (H) 0 - 40 U/L    ALT 68 (H) 0 - 45 U/L    Protein Total 6.1 6.0 - 8.0 g/dL    Albumin 3.0 (L) 3.5 - 5.0 g/dL    Bilirubin Total 0.9 0.0 - 1.0 mg/dL    GFR Estimate 83 >60 mL/min/1.73m2   Troponin I (now)   Result Value Ref Range    Troponin I 0.02 0.00 - 0.29 ng/mL   B-Type Natriuretic Peptide ([facility] Only)   Result Value Ref Range    BNP 31 0 - 53 pg/mL   D dimer quantitative   Result Value Ref Range    D-Dimer Quantitative 0.99 (H) 0.00 - 0.50 ug/mL [facility]    Result Value Ref Range    Procalcitonin 0.18 0.00 - 0.49 ng/mL   CBC with platelets and differential   Result Value Ref Range    WBC Count 5.2 4.0 - 11.0 10e3/uL    RBC Count 5.36 4.40 - 5.90 10e6/uL    Hemoglobin 16.8 13.3 - 17.7 g/dL    Hematocrit 48.0 40.0 - 53.0 %    MCV 90 78 - 100 fL    MCH 31.3 26.5 - 33.0 pg    MCHC 35.0 31.5 - 36.5 g/dL    RDW 11.9 10.0 - 15.0 %    Platelet Count     Symptomatic Influenza A/B & SARS-CoV2 (COVID-19) Virus PCR Multiplex Nasopharyngeal    Specimen: Nasopharyngeal; Swab   Result Value Ref Range    Influenza A PCR Negative Negative    Influenza B PCR Negative Negative    SARS CoV2 PCR Positive (A) Negative   CRP inflammation   Result Value Ref Range    CRP 7.1 (H) 0.0-<0.8 mg/dL   RBC and Platelet Morphology   Result Value Ref Range    Platelet Assessment Platelets Clumped (A) Automated Count Confirmed. Platelet morphology is normal.    RBC Morphology Confirmed RBC Indices        Pertinent imaging reviewed   Please see official radiology report.  CT Chest Pulmonary Embolism w Contrast   Final Result   IMPRESSION:   1.  Limited exam due to respiratory motion. No central or definite peripheral pulmonary artery embolism.   2.  Moderate to severe bilateral multilobar airspace disease consistent with COVID 19 pneumonia. No pleural effusion.           Interventions  Medications   remdesivir 200 mg in sodium chloride 0.9 % 250 mL intermittent infusion (has no administration in time range)     Followed by   0.9% sodium chloride BOLUS (has no administration in time range)   remdesivir 100 mg in sodium chloride 0.9 % 250 mL intermittent infusion (has no administration in time range)     And   0.9% sodium chloride BOLUS (has no administration in time range)   dexamethasone PF (DECADRON) injection 6 mg (6 mg Intravenous Given 12/6/21 6208)   iopamidol (ISOVUE-370) solution 100 mL (100 mLs Intravenous Given 12/6/21 5340)        ED Course/MDM:  3:29 PM Signout accepted from Dr. Wagner.   Prior records were reviewed.  Diagnostics from this visit are reviewed.  4:43 PM Spoke with Dr. Arias, hospitalist.   6:24 PM Exam benign.  Discussed CT imaging findings and again recommended administration of remdesivir.    Labs showed no acute concerning findings, troponin negative and ECG nonischemic.  At time of signout CT imaging of the chest was pending.  Again patient had already demonstrated hypoxia and was treated with dexamethasone and remdesivir, oxygen levels improved with supplemental oxygen and proning.  Covid test was positive here.  CT imaging returned and reported no pulmonary embolism but did report moderate to severe bilateral multilobar airspace disease consistent with COVID-19.  Patient was again treated with dexamethasone but he refused remdesivir stating that his physician had advised against using this particular medication.  On my exam the patient appeared quite well and comfortable and I again urged the patient to use the remdesivir but he deferred, I feel he has capacity and understands risk and benefits.  Patient will be admitted for continued care.  Discussed findings and care plan with the admitting service who is in agreement.           1. Infection due to 2019 novel coronavirus    2. Hypoxia    3. SOB (shortness of breath)          Randy Cordero MD  [facility] Emergency Department          Randy Cordero MD  12/06/21 6521

## 2021-12-07 PROBLEM — G47.33 OBSTRUCTIVE SLEEP APNEA SYNDROME: Status: ACTIVE | Noted: 2021-12-07

## 2021-12-07 PROBLEM — E78.2 MIXED HYPERLIPIDEMIA: Status: ACTIVE | Noted: 2021-12-07

## 2021-12-07 PROBLEM — J96.01 ACUTE RESPIRATORY FAILURE WITH HYPOXIA (H): Status: ACTIVE | Noted: 2021-12-07

## 2021-12-07 PROBLEM — K21.9 GASTROESOPHAGEAL REFLUX DISEASE: Status: ACTIVE | Noted: 2021-12-07

## 2021-12-07 LAB
ANION GAP SERPL CALCULATED.3IONS-SCNC: 13 MMOL/L (ref 5–18)
ATRIAL RATE - MUSE: 59 BPM
BUN SERPL-MCNC: 20 MG/DL (ref 8–22)
C REACTIVE PROTEIN LHE: 6.4 MG/DL (ref 0–0.8)
CALCIUM SERPL-MCNC: 8.5 MG/DL (ref 8.5–10.5)
CHLORIDE BLD-SCNC: 100 MMOL/L (ref 98–107)
CO2 SERPL-SCNC: 23 MMOL/L (ref 22–31)
CREAT SERPL-MCNC: 0.86 MG/DL (ref 0.7–1.3)
D DIMER PPP FEU-MCNC: 0.75 UG/ML FEU (ref 0–0.5)
DIASTOLIC BLOOD PRESSURE - MUSE: 72 MMHG
ERYTHROCYTE [DISTWIDTH] IN BLOOD BY AUTOMATED COUNT: 11.8 % (ref 10–15)
FIBRINOGEN PPP-MCNC: 521 MG/DL (ref 170–490)
GFR SERPL CREATININE-BSD FRML MDRD: >90 ML/MIN/1.73M2
GLUCOSE BLD-MCNC: 116 MG/DL (ref 70–125)
HCT VFR BLD AUTO: 44.3 % (ref 40–53)
HGB BLD-MCNC: 15.5 G/DL (ref 13.3–17.7)
INTERPRETATION ECG - MUSE: NORMAL
MCH RBC QN AUTO: 31.8 PG (ref 26.5–33)
MCHC RBC AUTO-ENTMCNC: 35 G/DL (ref 31.5–36.5)
MCV RBC AUTO: 91 FL (ref 78–100)
P AXIS - MUSE: 30 DEGREES
PLATELET # BLD AUTO: 172 10E3/UL (ref 150–450)
POTASSIUM BLD-SCNC: 4.7 MMOL/L (ref 3.5–5)
PR INTERVAL - MUSE: 144 MS
QRS DURATION - MUSE: 90 MS
QT - MUSE: 402 MS
QTC - MUSE: 397 MS
R AXIS - MUSE: 41 DEGREES
RBC # BLD AUTO: 4.88 10E6/UL (ref 4.4–5.9)
SODIUM SERPL-SCNC: 136 MMOL/L (ref 136–145)
SYSTOLIC BLOOD PRESSURE - MUSE: 124 MMHG
T AXIS - MUSE: -5 DEGREES
VENTRICULAR RATE- MUSE: 59 BPM
WBC # BLD AUTO: 4.2 10E3/UL (ref 4–11)

## 2021-12-07 PROCEDURE — 86141 C-REACTIVE PROTEIN HS: CPT | Performed by: FAMILY MEDICINE

## 2021-12-07 PROCEDURE — 80048 BASIC METABOLIC PNL TOTAL CA: CPT | Performed by: FAMILY MEDICINE

## 2021-12-07 PROCEDURE — 250N000011 HC RX IP 250 OP 636: Performed by: INTERNAL MEDICINE

## 2021-12-07 PROCEDURE — 85027 COMPLETE CBC AUTOMATED: CPT | Performed by: FAMILY MEDICINE

## 2021-12-07 PROCEDURE — 96372 THER/PROPH/DIAG INJ SC/IM: CPT

## 2021-12-07 PROCEDURE — 250N000012 HC RX MED GY IP 250 OP 636 PS 637: Performed by: FAMILY MEDICINE

## 2021-12-07 PROCEDURE — 120N000001 HC R&B MED SURG/OB

## 2021-12-07 PROCEDURE — 250N000013 HC RX MED GY IP 250 OP 250 PS 637: Performed by: INTERNAL MEDICINE

## 2021-12-07 PROCEDURE — 85384 FIBRINOGEN ACTIVITY: CPT | Performed by: FAMILY MEDICINE

## 2021-12-07 PROCEDURE — 999N000215 HC STATISTIC HFNC ADULT NON-CPAP

## 2021-12-07 PROCEDURE — 85379 FIBRIN DEGRADATION QUANT: CPT | Performed by: FAMILY MEDICINE

## 2021-12-07 PROCEDURE — 250N000013 HC RX MED GY IP 250 OP 250 PS 637: Performed by: FAMILY MEDICINE

## 2021-12-07 PROCEDURE — 99232 SBSQ HOSP IP/OBS MODERATE 35: CPT | Performed by: INTERNAL MEDICINE

## 2021-12-07 PROCEDURE — 36415 COLL VENOUS BLD VENIPUNCTURE: CPT | Performed by: FAMILY MEDICINE

## 2021-12-07 RX ORDER — PANTOPRAZOLE SODIUM 20 MG/1
40 TABLET, DELAYED RELEASE ORAL
Status: DISCONTINUED | OUTPATIENT
Start: 2021-12-07 | End: 2021-12-08 | Stop reason: HOSPADM

## 2021-12-07 RX ADMIN — FAMOTIDINE 20 MG: 20 TABLET, FILM COATED ORAL at 08:58

## 2021-12-07 RX ADMIN — PANTOPRAZOLE SODIUM 40 MG: 20 TABLET, DELAYED RELEASE ORAL at 18:06

## 2021-12-07 RX ADMIN — BARICITINIB 4 MG: 2 TABLET, FILM COATED ORAL at 20:28

## 2021-12-07 RX ADMIN — ENOXAPARIN SODIUM 50 MG: 100 INJECTION SUBCUTANEOUS at 20:28

## 2021-12-07 RX ADMIN — DEXAMETHASONE 6 MG: 4 TABLET ORAL at 12:32

## 2021-12-07 RX ADMIN — ENOXAPARIN SODIUM 50 MG: 100 INJECTION SUBCUTANEOUS at 08:57

## 2021-12-07 ASSESSMENT — ACTIVITIES OF DAILY LIVING (ADL)
ADLS_ACUITY_SCORE: 8
ADLS_ACUITY_SCORE: 8
ADLS_ACUITY_SCORE: 3
ADLS_ACUITY_SCORE: 8
WALKING_OR_CLIMBING_STAIRS_DIFFICULTY: NO
ADLS_ACUITY_SCORE: 8
ADLS_ACUITY_SCORE: 3
ADLS_ACUITY_SCORE: 8
DOING_ERRANDS_INDEPENDENTLY_DIFFICULTY: NO
ADLS_ACUITY_SCORE: 3
TOILETING_ISSUES: NO
ADLS_ACUITY_SCORE: 8
ADLS_ACUITY_SCORE: 3
DIFFICULTY_EATING/SWALLOWING: NO
DRESSING/BATHING_DIFFICULTY: NO
ADLS_ACUITY_SCORE: 8
ADLS_ACUITY_SCORE: 8
ADLS_ACUITY_SCORE: 3
CONCENTRATING,_REMEMBERING_OR_MAKING_DECISIONS_DIFFICULTY: NO
ADLS_ACUITY_SCORE: 8
PATIENT_/_FAMILY_COMMUNICATION_STYLE: SPOKEN LANGUAGE (ENGLISH OR BILINGUAL)
DIFFICULTY_COMMUNICATING: NO
ADLS_ACUITY_SCORE: 3
WEAR_GLASSES_OR_BLIND: NO
ADLS_ACUITY_SCORE: 8
ADLS_ACUITY_SCORE: 8
HEARING_DIFFICULTY_OR_DEAF: NO

## 2021-12-07 ASSESSMENT — MIFFLIN-ST. JEOR: SCORE: 1702.57

## 2021-12-07 NOTE — PROGRESS NOTES
D/w ED physician.       COVID patient on HFNC doing ok for now.      Rec:  ED has to contact SOC to place patient on transfer list for [facility] bed and [place]       Uncertain Causes of Abdominal Pain (Male)  Based on your visit today, the exact cause of your abdominal pain is not clear. Your exam and tests do not suggest a dangerous cause at this time. However, the signs of a serious problem may take more time to appear. Although your evaluation was reassuring today, sometimes early in the course of many conditions, exam and lab tests can appear normal. Therefore, it is important for you to watch for any new symptoms or worsening of your condition.  It may not be obvious what caused your symptoms. Pay attention to things that do seem to make your symptoms worse or better and discuss this with your doctor when you follow up.  The evaluation of abdominal pain in the emergency department may only require an exam by the doctor or it may include blood, urine or imaging studies, depending on many factors. Sometimes exams and tests can identify a cause but in many cases, a clear cause is not found. Further testing at follow up visits may help to suggest a clear diagnosis.  Home care  · Rest as much as you can until your next exam.  · Try to avoid any medicines (unless otherwise directed by your doctor), foods, activities, or other factors that may have contributed to your symptoms.  · Try to eat foods that you know that you have tolerated well in the past. Certain diets may be recommended for some conditions that cause abdominal pain. However, since the cause of your symptoms may not be clear, discuss your diet more with your healthcare provider or specialist for further recommendations.   · If you have diarrhea, it may help to avoid dairy (lactose) for the time being. A low fat, low fiber diet can also help.  · Eating several small meals per day as opposed to 2 or 3 larger meals may help.  · Avoid dehydration. Make sure to drink plenty of water. Other options include broth, soup, gelatin, sports drinks, or other clear liquids.  · Watch closely for anything that may make your  symptoms worse or better. Pay close attention to symptoms below that may mean your condition is getting worse.  Follow-up care  Follow up with your healthcare provider if your symptoms are not improving, or as advised. In some cases, you may need more testing.  When to seek medical advice  Call your healthcare provider right away if any of these occur:  · Pain is becoming worse  · You are unable to take your medicines or can't keep water down due to excessive vomiting  · Swelling of the abdomen  · Fever of 100.4ºF (38ºC) or higher, or as directed by your healthcare provider  · Blood in vomit or bowel movements (dark red or black color)  · Jaundice (yellow color of eyes and skin)  · New onset of weakness, dizziness or fainting  · New onset of chest, arm, back, neck or jaw pain  Date Last Reviewed: 12/30/2015  © 6527-4455 [employer]. [address] [place] [address]. All rights reserved. This information is not intended as a substitute for professional medical care. Always follow your healthcare professional's instructions.      Please return here or go to the Emergency Department for any concerns or worsening of condition.  If you were prescribed antibiotics, please take them to completion.  If you were prescribed a narcotic medication, do not drive or operate heavy equipment or machinery while taking these medications.  Please follow up with your primary care doctor or specialist as needed.    If you  smoke, please stop smoking.

## 2021-12-07 NOTE — PROGRESS NOTES
12/07/21 0448   Vital Signs   Resp 22   Pulse (!) 43   Oxygen Therapy   SpO2 90 %   O2 Device High Flow Nasal Cannula (HFNC)   FiO2 (%) 80 %   Oxygen Delivery 50 LPM

## 2021-12-07 NOTE — ED NOTES
Report received on patient, patient proning in room and sats WNL. In to introduce self. Lights dimmed and patient comfortable.

## 2021-12-07 NOTE — PROGRESS NOTES
Brief intensivist note  12/6/2021     Contacted by Dr. Kong to discuss Juan June. I am unable to leave the ICU to assess the patient in-person. We unfortunately do not have an ICU bed for this patient.     60 yo unvaccinated, previously healthy man w/ a recent COVID diagnosis, admitted 12/6 w/ acute hypoxic respiratory failure secondary to a severe COVID pneumonia. Initial SpO2 of 80% on RA in ED, rapidly progressed to requiring HFNC. Imaging negative for PE.    Recommendations/Plan:     Goal SpO2 >/= 90%, wean down supplemental oxygen to avoid hyperoxia (keep SpO2 90-94%)    Continue w/ HFNC as tolerated    If starts to require BiPAP, make strict NPO & strict bed rest (including commode/bathroom use)    Self prone for as long as tolerated, ideally for at least 16 hours/day    Maintain net-negative (or at least net-even) daily volume balance    COVID-19 specific therapies per [facility]/ED -- dexamethasone, remdesivir    Baricitinib discussed by [facility] w/ ID, started    Anticoagulation -- enoxaparin 0.5 mg/kg BID    Recommend PICC line placement as patient's rapid deterioration is concerning & he is at a high risk of intubation    Please, contact the Tele-ICU service in the morning (available 7a-7p) w/ ongoing critical medicine questions.   Please, contact the SOC to ensure appropriate placement if/when a bed becomes available w/in the system.    Heather Cheung MD  Pulmonary and Critical Care

## 2021-12-07 NOTE — ED NOTES
Pt. Requesting to have his meds back that he came in with so he can have his daughter pick them up so they dont get lost. Nurse notified.

## 2021-12-07 NOTE — ED NOTES
Patient has consitently remained at between 85-90 on high flow nasal cannula. Patient is noted to be asleep and is more mouth breathing at this time. ED RT notified. Will find an adapter mask to deliver oxygen.

## 2021-12-07 NOTE — PROGRESS NOTES
[facility]    Medicine Progress Note - Hospitalist Service    Date of Admission:  12/6/2021     Assessment & Plan   SUMMARY:  61 year old male with past medical history of dyslipidemia, EMILY (untreated), GERD, presented to the [facility] ER with shortness of breath and Covid positive x1 week.  Was 85% on room air at home.  Not vaccinated against COVID-19.    Calculate minimum 20 days from onset of symptoms for covid recovered date due to severity of disease  -ICU aware of patient, no ICU beds in house.   -Reported patient condition to Tele ICU on 12/7, called SOC and requested IMC/ICU bed  -12/7/2021: HFNC 50 liters, 80% oxygen    Anticipate >7 days of hospitalization    ACTIVE PROBLEM LIST  Acute hypoxic respiratory failure  COVID-19 pneumonia  -Rapidly worsening hypoxia 85% on presentation, increasing requirements to 50 L at 60% FiO2.  RT supervising proning and oxygen.  Unvaccinated.  -Tested + on 11/29  -CTA chest no evidence of PE, but does show bilateral lower lobe pneumonia consistent with moderate to severe COVID-19 infiltrates  -Dexamethasone 6 mg daily x10 days  -Remdesivir declined - patient states his belief in negative news regarding this medication.  -baricitanib 4mg daily x 14 days  -Covid-19 precautions  -Covid daily labs  -VTE ppx with lovenox - mid-level intensity due to severe Covid-19  -encourage proning  -Procalcitonin = 0.18  -BNP = 31  -troponin negative x 3  -Diet - full liquids due to respiratory failure    Hyponatremia  Resolved with IV bolus  monitor    Transaminitis/hepatitis  Likely due to Covid viral inflammation  Check fasting RUQ US tomorrow AM    Elevated hgb/hemoconcentration  Likely mild dehydration, has received some fluids  Plan to keep patient slightly hypovolemic  Renal function currently normal    EMILY  - oxygen, no CPAP while in hospital  - patient did not tolerate CPAP mask in the past, and has not used    GERD  - PPI while on  "dexamethasone    Dyslipidemia  - not currently under treatment    Clinically Significant Risk Factors Present on Admission                Diet: Orders Placed This Encounter      Full Liquid Diet    Correa Catheter: Not present  Central Lines/Port-a-cath: Not present  Drains: Not present     --------------------------------------------    The patient's care was discussed with the Patient.    Principal Problem:    Infection due to 2019 novel coronavirus  Active Problems:    SOB (shortness of breath)    Hypoxia    Gastroesophageal reflux disease without esophagitis    Mixed hyperlipidemia    Obstructive sleep apnea syndrome    Acute respiratory failure with hypoxia (H)      Chalino Sandy MD  Hospitalist Service  [facility]  Securely message with the Vocera Web Console (learn more here)  Text page via [employer] Paging/Directory    Clinically Significant Risk Factors Present on Admission             ______________________________________________________________________    Interval History   Patient still feels mildly short of breath on HFNC, especially when sitting up to use urinal     ROS: Denies chest pain and passing urine well    Data personally reviewed from the last 24 hours:   Radiology: CT reports  Reviewed telemetry, Laboratory results, Consultant recommendations and medications     Physical Exam   /66   Pulse 53   Temp 99.5  F (37.5  C) (Oral)   Resp 22   Ht 1.753 m (5' 9\")   Wt 90.7 kg (200 lb)   SpO2 96%   BMI 29.53 kg/m      Physical Exam    General Appearance:    HEENT:  Awake, Alert, Cooperative, in no distress and appears stated age   Normocephalic, atraumatic, conjunctiva clear without icterus and ears without discharge   Lungs:   Crackles at bases   Cardiovascular:  Regular Rate and Rythm, normal apical impulse, normal S1 and S2, no lower extremity edema bilaterally   Abdomen: Soft, non-tender and Non-distended, active bowel sounds   Skin:  Skin color, texture normal " and bruising or bleeding. No rashes or lesions over face, neck, arms and legs, turgor normal.   Neurologic:    Neuropsychiatric: Alert & Oriented X 3, Facial symmetry preserved and upper & lower extremities moving well with symmetry  Calm, normal eye contact, Affect normal     Data   Recent Labs   Lab 12/07/21  0830 12/06/21  1457 12/06/21  1303   WBC 4.2  --  5.2   HGB 15.5  --  16.8   MCV 91  --  90     --   --    INR  --   --  0.95    133*  --    POTASSIUM 4.7 3.7  --    CHLORIDE 100 96*  --    CO2 23 26  --    BUN 20 21  --    CR 0.86 0.98  --    ANIONGAP 13 11  --    AVERY 8.5 8.3*  --     97  --    ALBUMIN  --  3.0*  --    PROTTOTAL  --  6.1  --    BILITOTAL  --  0.9  --    ALKPHOS  --  47  --    ALT  --  68*  --    AST  --  96*  --      Recent Results (from the past 24 hour(s))   CT Chest Pulmonary Embolism w Contrast    Narrative    EXAM: CT CHEST PULMONARY EMBOLISM W CONTRAST  LOCATION: [facility]  DATE/TIME: 12/6/2021 5:27 PM    INDICATION: Shortness of breath, positive Covid 19, hypoxia  COMPARISON: None.  TECHNIQUE: CT chest pulmonary angiogram during arterial phase injection of IV contrast. Multiplanar reformats and MIP reconstructions were performed. Dose reduction techniques were used.   CONTRAST: isovue 370  100ml    FINDINGS:  ANGIOGRAM CHEST: Limited due to respiratory motion. No central or definite peripheral pulmonary artery embolism. Thoracic aorta is negative for dissection. No CT evidence of right heart strain.    LUNGS AND PLEURA: The central airways are clear. Moderate to severe bilateral multilobar groundglass and consolidative airspace opacities, right greater than left. No pleural effusion.    MEDIASTINUM/AXILLAE: Prominent likely reactive mediastinal and hilar lymph nodes. Upper normal heart size. No pericardial effusion. Tiny hiatal hernia.    CORONARY ARTERY CALCIFICATION: Mild.    UPPER ABDOMEN: Symmetric bilateral perinephric stranding is  likely chronic.    MUSCULOSKELETAL: Spinal degenerative changes.      Impression    IMPRESSION:  1.  Limited exam due to respiratory motion. No central or definite peripheral pulmonary artery embolism.  2.  Moderate to severe bilateral multilobar airspace disease consistent with COVID 19 pneumonia. No pleural effusion.

## 2021-12-07 NOTE — PLAN OF CARE
Problem: Gas Exchange Impaired  Goal: Optimal Gas Exchange  Outcome: No Change  Intervention: Optimize Oxygenation and Ventilation  Recent Flowsheet Documentation  Taken 12/7/2021 1244 by Di Eaton RN  Head of Bed (HOB) Positioning: HOB at 60 degrees  Taken 12/7/2021 0930 by Di Eaton RN  Head of Bed (HOB) Positioning: HOB flat  Taken 12/7/2021 0900 by Di Eaton RN  Head of Bed (HOB) Positioning: HOB flat    Continues on high flow mask 80% 50 liters. Verbal report given to critical care nurse Bernice MCNAMARA who will be assuming care.  Di Eaton RN

## 2021-12-07 NOTE — PROGRESS NOTES
12/06/21 2142   Vital Signs   Resp 17   Pulse 55   Oxygen Therapy   SpO2 94 %   O2 Device High Flow Nasal Cannula (HFNC)   FiO2 (%) 60 %   Oxygen Delivery 50 LPM

## 2021-12-07 NOTE — PHARMACY-ADMISSION MEDICATION HISTORY
Pharmacy Note - Admission Medication History    Pertinent Provider Information: Patient was prescribed ivermectin, azithromycin and methylprednisolone today. Only the ivermectin was taken this morning.     ______________________________________________________________________    Prior To Admission (PTA) med list completed and updated in EMR.       PTA Med List   Medication Sig Last Dose     omeprazole (PRILOSEC) 20 MG DR capsule Take 20 mg by mouth daily Past Week at Unknown time       Information source(s): Patient and CareEverywhere/SureScripts  Method of interview communication: phone    Summary of Changes to PTA Med List  New: Omeprazole  Discontinued: None  Changed: None    Patient was asked about OTC/herbal products specifically.  PTA med list reflects this.    In the past week, patient estimated taking medication this percent of the time:  50-90% due to illness.    Allergies were reviewed, assessed, and updated with the patient.      Patient does not use any multi-dose medications prior to admission.    The information provided in this note is only as accurate as the sources available at the time of the update(s).    Thank you for the opportunity to participate in the care of this patient.    Eleonora Corley [facility]  12/6/2021 7:01 PM

## 2021-12-07 NOTE — PROGRESS NOTES
Patient continues on high flow NC 50lpm with 80% Sao2 93%.  Encouraged patient to prone.  Chuy Oneal, RT

## 2021-12-07 NOTE — H&P
[facility]    History and Physical - Hospitalist Service       Date of Admission:  12/6/2021    Assessment & Plan      Patient is a 61-year-old male without any significant past medical history that presented to the ER with shortness of breath and Covid positive x1 week.  Was 85% on room air at home.  Not vaccinated against COVID-19.    Acute hypoxic respiratory failure  COVID-19 pneumonia  -Rapidly worsening hypoxia 85% on presentation, increasing requirements to 50 L at 60% FiO2.  RT working on proning and weaning oxygen.  Unvaccinated.  -Tested + on 11/29  -CTA chest no evidence of PE, but does show bilateral lower lobe pneumonia consistent with moderate to severe COVID-19 infiltrates  -Dexamethasone 6 mg daily x10 days  -Remdesivir declined  -baricitanib 4mg daily x 14 days  -Covid-19 precautions  -Covid daily labs  -VTE ppx with lovenox  -encourage proning  -ICU aware of patient, no ICU beds in house. Will need to call Tele ICU in the AM to check in [phone].       Diet: Combination Diet Regular Diet Adult    DVT Prophylaxis: Enoxaparin (Lovenox) SQ  Correa Catheter: Not present  Central Lines: None  Code Status: Full Code      Disposition Plan   Expected discharge: TBD depending on clinical course with Covid     The patient's care was discussed with the Patient.    Sky Kong MD  [facility]  Securely message with the Vocera Web Console (learn more here)  Text page via [employer] Paging/Directory        ______________________________________________________________________    Chief Complaint     Shortness of breath    History of Present Illness   Juan June is a  61-year-old male without any significant past medical history that presented to the ER with shortness of breath and Covid positive x1 week.  Was 85% on room air at home.  Not vaccinated against COVID-19.  Patient reports shortness of breath, trying to do proning which feels little bit  "better but does not tolerate well on his face.  Denies any fever, nausea, vomiting, diarrhea.  No other medical issues.  Says he is told people about his COVID-19 status of the get checked.  Does not want remdesivir.  Consents to [employer].      Review of Systems    The 10 point Review of Systems is negative other than noted in the HPI or here.     Past Medical History    No PMH    Past Surgical History   Denies any surgeries    Social History   I have reviewed this patient's social history and updated it with pertinent information if needed.  Social History     Tobacco Use     Smoking status: Former Smoker     Quit date: 1978     Years since quittin.6     Smokeless tobacco: Not on file   Substance Use Topics     Alcohol use: Yes     Comment: rare     Drug use: Not on file       Family History   I have reviewed this patient's family history and updated it with pertinent information if needed.  Family History   Problem Relation Age of Onset     Hypertension Mother         born 1937     Lipids Father         born 1933     Family History Negative Brother        Prior to Admission Medications   Prior to Admission Medications   Prescriptions Last Dose Informant Patient Reported? Taking?   FLONASE INHA 50 MCG/DOSE NA Not Taking at Unknown time  No No   Si SPRAYS IN EACH NOSTRIL QD (Once per day)   Patient not taking: Reported on 2021   IBUPROFEN 200 200 MG OR TABS Not Taking at Unknown time  No No   Si TABLETs   EVERY 4 TO 6 HOURS AS NEEDED   Patient not taking: Reported on 2021   LIQUIBID 1200 1200 MG OR TB12 Not Taking at Unknown time  No No   Si TABLET EVERY 12 HOURS AS NEEDED   Patient not taking: Reported on 2021   omeprazole (PRILOSEC) 20 MG DR capsule Past Week at Unknown time  Yes Yes   Sig: Take 20 mg by mouth daily      Facility-Administered Medications: None     Allergies   Allergies   Allergen Reactions     Penicillins      \"?\" as child       Physical Exam   Vital Signs: " Temp: 98.2  F (36.8  C) Temp src: Temporal BP: 119/67 Pulse: 68   Resp: 24 SpO2: 93 % O2 Device: High Flow Nasal Cannula (HFNC) Oxygen Delivery: 50 LPM  Weight: 200 lbs 0 oz    Physical Examination:   General appearance - alert, well appearing, and in no distress  Mental status - alert, oriented to person, place, and time, normal mood, behavior, speech, dress, motor activity, and thought processes  HEENT - sclera anicteric, left eye normal, right eye normal, nares normal and patent, no erythema, discharge or polyps and sinuses normal and nontender, mucous membranes moist, pharynx normal without lesions, dental hygiene good and tongue normal  Respiratory - diffuse coarse breath sounds  Cardiac - normal rate, regular rhythm, normal S1, S2, no murmurs  Abdomen - soft, nontender, nondistended  Neurological - alert, oriented, normal speech, no focal findings or movement disorder noted  Musculoskeletal - no joint tenderness, deformity or swelling, full range of motion without pain  Extremities - peripheral pulses normal, no pedal edema,   Skin - normal coloration and turgor, no rashes, no suspicious skin lesions noted      Data   Data reviewed today: I reviewed all medications, new labs and imaging results over the last 24 hours.     Recent Labs   Lab 12/06/21  1457 12/06/21  1303   WBC  --  5.2   HGB  --  16.8   MCV  --  90   INR  --  0.95   *  --    POTASSIUM 3.7  --    CHLORIDE 96*  --    CO2 26  --    BUN 21  --    CR 0.98  --    ANIONGAP 11  --    AVERY 8.3*  --    GLC 97  --    ALBUMIN 3.0*  --    PROTTOTAL 6.1  --    BILITOTAL 0.9  --    ALKPHOS 47  --    ALT 68*  --    AST 96*  --      Recent Results (from the past 24 hour(s))   CT Chest Pulmonary Embolism w Contrast    Narrative    EXAM: CT CHEST PULMONARY EMBOLISM W CONTRAST  LOCATION: [facility]  DATE/TIME: 12/6/2021 5:27 PM    INDICATION: Shortness of breath, positive Covid 19, hypoxia  COMPARISON: None.  TECHNIQUE: CT chest  pulmonary angiogram during arterial phase injection of IV contrast. Multiplanar reformats and MIP reconstructions were performed. Dose reduction techniques were used.   CONTRAST: isovue 370  100ml    FINDINGS:  ANGIOGRAM CHEST: Limited due to respiratory motion. No central or definite peripheral pulmonary artery embolism. Thoracic aorta is negative for dissection. No CT evidence of right heart strain.    LUNGS AND PLEURA: The central airways are clear. Moderate to severe bilateral multilobar groundglass and consolidative airspace opacities, right greater than left. No pleural effusion.    MEDIASTINUM/AXILLAE: Prominent likely reactive mediastinal and hilar lymph nodes. Upper normal heart size. No pericardial effusion. Tiny hiatal hernia.    CORONARY ARTERY CALCIFICATION: Mild.    UPPER ABDOMEN: Symmetric bilateral perinephric stranding is likely chronic.    MUSCULOSKELETAL: Spinal degenerative changes.      Impression    IMPRESSION:  1.  Limited exam due to respiratory motion. No central or definite peripheral pulmonary artery embolism.  2.  Moderate to severe bilateral multilobar airspace disease consistent with COVID 19 pneumonia. No pleural effusion.

## 2021-12-08 ENCOUNTER — HOSPITAL ENCOUNTER (INPATIENT)
Facility: CLINIC | Age: 61
LOS: 14 days | Discharge: HOME | DRG: 177 | End: 2021-12-22
Attending: ANESTHESIOLOGY | Admitting: ANESTHESIOLOGY
Payer: COMMERCIAL

## 2021-12-08 VITALS
DIASTOLIC BLOOD PRESSURE: 55 MMHG | WEIGHT: 200 LBS | TEMPERATURE: 98.9 F | RESPIRATION RATE: 26 BRPM | BODY MASS INDEX: 29.62 KG/M2 | SYSTOLIC BLOOD PRESSURE: 110 MMHG | OXYGEN SATURATION: 97 % | HEIGHT: 69 IN | HEART RATE: 48 BPM

## 2021-12-08 DIAGNOSIS — U07.1 INFECTION DUE TO 2019 NOVEL CORONAVIRUS: Primary | ICD-10-CM

## 2021-12-08 DIAGNOSIS — J96.01 ACUTE RESPIRATORY FAILURE WITH HYPOXIA (H): ICD-10-CM

## 2021-12-08 PROBLEM — H93.13 BILATERAL TINNITUS: Status: ACTIVE | Noted: 2021-12-08

## 2021-12-08 LAB
ALBUMIN SERPL-MCNC: 2.7 G/DL (ref 3.4–5)
ALP SERPL-CCNC: 51 U/L (ref 40–150)
ALT SERPL W P-5'-P-CCNC: 75 U/L (ref 0–70)
ANION GAP SERPL CALCULATED.3IONS-SCNC: 8 MMOL/L (ref 3–14)
AST SERPL W P-5'-P-CCNC: 89 U/L (ref 0–45)
BILIRUB SERPL-MCNC: 1.2 MG/DL (ref 0.2–1.3)
BUN SERPL-MCNC: 23 MG/DL (ref 7–30)
CALCIUM SERPL-MCNC: 8.5 MG/DL (ref 8.5–10.1)
CHLORIDE BLD-SCNC: 102 MMOL/L (ref 94–109)
CO2 SERPL-SCNC: 26 MMOL/L (ref 20–32)
CREAT SERPL-MCNC: 0.87 MG/DL (ref 0.66–1.25)
CRP SERPL-MCNC: 45 MG/L (ref 0–8)
ERYTHROCYTE [DISTWIDTH] IN BLOOD BY AUTOMATED COUNT: 11.9 % (ref 10–15)
GFR SERPL CREATININE-BSD FRML MDRD: >90 ML/MIN/1.73M2
GLUCOSE BLD-MCNC: 118 MG/DL (ref 70–99)
GLUCOSE BLDC GLUCOMTR-MCNC: 105 MG/DL (ref 70–99)
GLUCOSE BLDC GLUCOMTR-MCNC: 115 MG/DL (ref 70–99)
GLUCOSE BLDC GLUCOMTR-MCNC: 118 MG/DL (ref 70–99)
GLUCOSE BLDC GLUCOMTR-MCNC: 119 MG/DL (ref 70–99)
HCT VFR BLD AUTO: 46.9 % (ref 40–53)
HGB BLD-MCNC: 15.8 G/DL (ref 13.3–17.7)
MCH RBC QN AUTO: 31 PG (ref 26.5–33)
MCHC RBC AUTO-ENTMCNC: 33.7 G/DL (ref 31.5–36.5)
MCV RBC AUTO: 92 FL (ref 78–100)
PLATELET # BLD AUTO: 232 10E3/UL (ref 150–450)
POTASSIUM BLD-SCNC: 4.2 MMOL/L (ref 3.4–5.3)
PROCALCITONIN SERPL-MCNC: 0.07 NG/ML
PROT SERPL-MCNC: 6.8 G/DL (ref 6.8–8.8)
RBC # BLD AUTO: 5.09 10E6/UL (ref 4.4–5.9)
SODIUM SERPL-SCNC: 136 MMOL/L (ref 133–144)
WBC # BLD AUTO: 5.8 10E3/UL (ref 4–11)

## 2021-12-08 PROCEDURE — 999N000157 HC STATISTIC RCP TIME EA 10 MIN

## 2021-12-08 PROCEDURE — 200N000002 HC R&B ICU UMMC

## 2021-12-08 PROCEDURE — 250N000013 HC RX MED GY IP 250 OP 250 PS 637: Performed by: STUDENT IN AN ORGANIZED HEALTH CARE EDUCATION/TRAINING PROGRAM

## 2021-12-08 PROCEDURE — 94660 CPAP INITIATION&MGMT: CPT

## 2021-12-08 PROCEDURE — 5A09457 ASSISTANCE WITH RESPIRATORY VENTILATION, 24-96 CONSECUTIVE HOURS, CONTINUOUS POSITIVE AIRWAY PRESSURE: ICD-10-PCS | Performed by: INTERNAL MEDICINE

## 2021-12-08 PROCEDURE — 84145 PROCALCITONIN (PCT): CPT | Performed by: STUDENT IN AN ORGANIZED HEALTH CARE EDUCATION/TRAINING PROGRAM

## 2021-12-08 PROCEDURE — 86140 C-REACTIVE PROTEIN: CPT | Performed by: STUDENT IN AN ORGANIZED HEALTH CARE EDUCATION/TRAINING PROGRAM

## 2021-12-08 PROCEDURE — 250N000012 HC RX MED GY IP 250 OP 636 PS 637: Performed by: STUDENT IN AN ORGANIZED HEALTH CARE EDUCATION/TRAINING PROGRAM

## 2021-12-08 PROCEDURE — 250N000011 HC RX IP 250 OP 636: Performed by: STUDENT IN AN ORGANIZED HEALTH CARE EDUCATION/TRAINING PROGRAM

## 2021-12-08 PROCEDURE — 80053 COMPREHEN METABOLIC PANEL: CPT | Performed by: STUDENT IN AN ORGANIZED HEALTH CARE EDUCATION/TRAINING PROGRAM

## 2021-12-08 PROCEDURE — 85014 HEMATOCRIT: CPT | Performed by: STUDENT IN AN ORGANIZED HEALTH CARE EDUCATION/TRAINING PROGRAM

## 2021-12-08 PROCEDURE — XW0G7M6 INTRODUCTION OF BARICITINIB INTO UPPER GI, VIA NATURAL OR ARTIFICIAL OPENING, NEW TECHNOLOGY GROUP 6: ICD-10-PCS | Performed by: INTERNAL MEDICINE

## 2021-12-08 PROCEDURE — 36415 COLL VENOUS BLD VENIPUNCTURE: CPT | Performed by: STUDENT IN AN ORGANIZED HEALTH CARE EDUCATION/TRAINING PROGRAM

## 2021-12-08 RX ORDER — AZITHROMYCIN 500 MG/1
500 TABLET, FILM COATED ORAL DAILY
Status: ON HOLD | COMMUNITY
Start: 2021-12-06 | End: 2021-12-08

## 2021-12-08 RX ORDER — NICOTINE POLACRILEX 4 MG
15-30 LOZENGE BUCCAL
Status: DISCONTINUED | OUTPATIENT
Start: 2021-12-08 | End: 2021-12-10

## 2021-12-08 RX ORDER — METHYLPREDNISOLONE 4 MG
4 TABLET, DOSE PACK ORAL 2 TIMES DAILY
Status: ON HOLD | COMMUNITY
Start: 2021-12-06 | End: 2021-12-08

## 2021-12-08 RX ORDER — DEXTROSE MONOHYDRATE 25 G/50ML
25-50 INJECTION, SOLUTION INTRAVENOUS
Status: DISCONTINUED | OUTPATIENT
Start: 2021-12-08 | End: 2021-12-10

## 2021-12-08 RX ORDER — PANTOPRAZOLE SODIUM 40 MG/1
40 TABLET, DELAYED RELEASE ORAL
Status: DISCONTINUED | OUTPATIENT
Start: 2021-12-08 | End: 2021-12-22 | Stop reason: HOSPADM

## 2021-12-08 RX ORDER — ASPIRIN 81 MG/1
81 TABLET ORAL DAILY
Status: ON HOLD | COMMUNITY
End: 2021-12-08

## 2021-12-08 RX ADMIN — BARICITINIB 4 MG: 2 TABLET, FILM COATED ORAL at 08:33

## 2021-12-08 RX ADMIN — ENOXAPARIN SODIUM 50 MG: 60 INJECTION SUBCUTANEOUS at 08:35

## 2021-12-08 RX ADMIN — ENOXAPARIN SODIUM 50 MG: 60 INJECTION SUBCUTANEOUS at 19:49

## 2021-12-08 RX ADMIN — PANTOPRAZOLE SODIUM 40 MG: 40 TABLET, DELAYED RELEASE ORAL at 08:33

## 2021-12-08 RX ADMIN — DEXAMETHASONE 6 MG: 2 TABLET ORAL at 08:33

## 2021-12-08 ASSESSMENT — ACTIVITIES OF DAILY LIVING (ADL)
HEARING_DIFFICULTY_OR_DEAF: NO
WHICH_OF_THE_ABOVE_FUNCTIONAL_RISKS_HAD_A_RECENT_ONSET_OR_CHANGE?: EATING
ADLS_ACUITY_SCORE: 9
FALL_HISTORY_WITHIN_LAST_SIX_MONTHS: NO
ADLS_ACUITY_SCORE: 9
DRESSING/BATHING_DIFFICULTY: NO
DOING_ERRANDS_INDEPENDENTLY_DIFFICULTY: NO
PATIENT_/_FAMILY_COMMUNICATION_STYLE: SPOKEN LANGUAGE (ENGLISH OR BILINGUAL)
CONCENTRATING,_REMEMBERING_OR_MAKING_DECISIONS_DIFFICULTY: NO
ADLS_ACUITY_SCORE: 3
ADLS_ACUITY_SCORE: 5
ADLS_ACUITY_SCORE: 9
DIFFICULTY_EATING/SWALLOWING: NO
WEAR_GLASSES_OR_BLIND: NO
DIFFICULTY_COMMUNICATING: NO
ADLS_ACUITY_SCORE: 9
ADLS_ACUITY_SCORE: 18
ADLS_ACUITY_SCORE: 18
WALKING_OR_CLIMBING_STAIRS_DIFFICULTY: NO
ADLS_ACUITY_SCORE: 9
ADLS_ACUITY_SCORE: 3
ADLS_ACUITY_SCORE: 9
ADLS_ACUITY_SCORE: 9
ADLS_ACUITY_SCORE: 3
ADLS_ACUITY_SCORE: 12
ADLS_ACUITY_SCORE: 3
TOILETING_ISSUES: NO
ADLS_ACUITY_SCORE: 9

## 2021-12-08 NOTE — PHARMACY-ADMISSION MEDICATION HISTORY
Admission Medication History Completed by Pharmacy    See [facility] Admission Navigator for allergy information, preferred outpatient pharmacy, prior to admission medications and immunization status.     Medication History Sources:     Pharmacist medication history completed 12/6 at [facility]    Changes made to PTA medication list (reason):    Added: None    Deleted: Aspirin 81 mg, methylpred and azithromycin (prescribed but patient never started prior to admit)    Changed: None    Additional Information:  -Patient was started on ivermectin, azithromycin and methylprednisolone on 12/6.  Only fills at the pharmacy for azithromycin and methylprednisolone. Only the ivermectin was taken the morning of 12/6 per [facility] med history.    Prior to Admission medications    Medication Sig Last Dose Taking? Auth Provider   omeprazole (PRILOSEC) 20 MG DR capsule Take 20 mg by mouth daily  Yes Reported, Patient       Date completed: 12/08/21    Medication history completed by: Olga Hercules, PharmD, BCPS

## 2021-12-08 NOTE — DISCHARGE SUMMARY
[facility]  Hospitalist Discharge Summary      Date of Admission:  12/6/2021  Date of Discharge:  12/8/2021  Discharging Provider: Demario Crespo MD      Discharge Diagnoses   Acute respiratory failure with hypoxia  COVID-19 pneumonia  Hyponatremia  Transaminitis  EMILY  GERD  Dyslipidemia    Follow-ups Needed After Discharge   To be determined after discharge from [facility]    Unresulted Labs Ordered in the Past 30 Days of this Admission     No orders found from 11/6/2021 to 12/7/2021.      These results will be followed up: by N/A    Discharge Disposition   Discharged to [facility] ICU  Condition at discharge: Stable      Hospital Course      HPI:  Juan June is a  61-year-old male without any significant past medical history that presented to the ER with shortness of breath and Covid positive x1 week.  Was 85% on room air at home.  Not vaccinated against COVID-19.  Patient reports shortness of breath, trying to do proning which feels little bit better but does not tolerate well on his face.  Denies any fever, nausea, vomiting, diarrhea.  No other medical issues.  Says he is told people about his COVID-19 status of the get checked.  Does not want remdesivir.  Consents to barcitinib.      SUMMARY:  61 year old male with past medical history of dyslipidemia, EMILY (untreated), GERD, presented to the [facility] ER with shortness of breath and Covid positive x1 week.  Was 85% on room air at home.  Not vaccinated against COVID-19.     Calculate minimum 20 days from onset of symptoms for covid recovered date due to severity of disease  -ICU aware of patient, no ICU beds in house.   -Reported patient condition to Tele ICU on 12/7, called SOC and requested IMC/ICU bed  -12/7/2021: HFNC 50 liters, 80% oxygen     Anticipate >7 days of hospitalization     ACTIVE PROBLEM LIST  Acute hypoxic respiratory failure  COVID-19 pneumonia  -Rapidly worsening hypoxia 85% on presentation, increasing requirements to  50 L at 60% FiO2.  RT supervising proning and oxygen.  Unvaccinated.  -Tested + on 11/29  -CTA chest no evidence of PE, but does show bilateral lower lobe pneumonia consistent with moderate to severe COVID-19 infiltrates  -Dexamethasone 6 mg daily x10 days  -Remdesivir declined - patient states his belief in negative news regarding this medication.  -baricitanib 4mg daily x 14 days  -Covid-19 precautions  -Covid daily labs  -VTE ppx with lovenox - mid-level intensity due to severe Covid-19  -encourage proning  -Procalcitonin = 0.18  -BNP = 31  -troponin negative x 3  -Diet - full liquids due to respiratory failure     Hyponatremia  Resolved with IV bolus  monitor     Transaminitis/hepatitis  Likely due to Covid viral inflammation  Check fasting RUQ US tomorrow AM     Elevated hgb/hemoconcentration  Likely mild dehydration, has received some fluids  Plan to keep patient slightly hypovolemic  Renal function currently normal     EMILY  - oxygen, no CPAP while in hospital  - patient did not tolerate CPAP mask in the past, and has not used     GERD  - PPI while on dexamethasone     Dyslipidemia  - not currently under treatment    Addendum:  Patient is still boarding in the ED and self pronates, however has required high flow nasal cannula 60 L and FiO2 100%. He was noted with desaturations while asleep so as needed BiPAP is ordered. A bed became available at [facility] ICU so patient will transfer.    Consultations This Hospital Stay   None    Code Status   Full Code    Time Spent on this Encounter   I, Demario Crespo MD, personally spent less than or equal to 30 minutes discharging this patient.       Demario Crespo MD  [facility] EMERGENCY DEPARTMENT  [address] [place] [id number]  Phone: [phone]  ______________________________________________________________________    Physical Exam   Vital Signs: Temp: 98.9  F (37.2  C) Temp src: Oral BP: 98/58 Pulse: (!) 44   Resp: 26 SpO2:  93 % O2 Device: High Flow Nasal Cannula (HFNC) Oxygen Delivery: 60 LPM  Weight: 200 lbs 0 oz         Primary Care Physician   Monroe Izquierdo    Discharge Orders      Full Code       Significant Results and Procedures   Results for orders placed or performed during the hospital encounter of 12/06/21   CT Chest Pulmonary Embolism w Contrast    Narrative    EXAM: CT CHEST PULMONARY EMBOLISM W CONTRAST  LOCATION: [facility]  DATE/TIME: 12/6/2021 5:27 PM    INDICATION: Shortness of breath, positive Covid 19, hypoxia  COMPARISON: None.  TECHNIQUE: CT chest pulmonary angiogram during arterial phase injection of IV contrast. Multiplanar reformats and MIP reconstructions were performed. Dose reduction techniques were used.   CONTRAST: isovue 370  100ml    FINDINGS:  ANGIOGRAM CHEST: Limited due to respiratory motion. No central or definite peripheral pulmonary artery embolism. Thoracic aorta is negative for dissection. No CT evidence of right heart strain.    LUNGS AND PLEURA: The central airways are clear. Moderate to severe bilateral multilobar groundglass and consolidative airspace opacities, right greater than left. No pleural effusion.    MEDIASTINUM/AXILLAE: Prominent likely reactive mediastinal and hilar lymph nodes. Upper normal heart size. No pericardial effusion. Tiny hiatal hernia.    CORONARY ARTERY CALCIFICATION: Mild.    UPPER ABDOMEN: Symmetric bilateral perinephric stranding is likely chronic.    MUSCULOSKELETAL: Spinal degenerative changes.      Impression    IMPRESSION:  1.  Limited exam due to respiratory motion. No central or definite peripheral pulmonary artery embolism.  2.  Moderate to severe bilateral multilobar airspace disease consistent with COVID 19 pneumonia. No pleural effusion.       Discharge Medications   Current Discharge Medication List      STOP taking these medications       FLONASE INHA 50 MCG/DOSE NA Comments:   Reason for Stopping:         IBUPROFEN 200 200  "MG OR TABS Comments:   Reason for Stopping:         LIQUIBID 1200 1200 MG OR TB12 Comments:   Reason for Stopping:         omeprazole (PRILOSEC) 20 MG DR capsule Comments:   Reason for Stopping:             Allergies   Allergies   Allergen Reactions     Penicillins      \"?\" as child     "

## 2021-12-08 NOTE — PROGRESS NOTES
Report called to Vikki MCNAMARA at [facility] unit 4A for pt transfer.     Fabiola Carrington RN

## 2021-12-08 NOTE — PROGRESS NOTES
Please see H&P for the admission note, Patient remains stable fluctuating between BiPAP and HFNC, Patient is persistently bradycardic in 40's however no sings of inadequate perfusion with this. Will continue to follow and if he remains stable likely transfer out tomorrow    Critical Care Services Progress Note:     Juan June remains critically ill with hypoxic respiratory failure     I personally examined and evaluated the patient today.   The patient s prognosis today is unchanged  I have evaluated all laboratory values and imaging studies from the past 24 hours.    Juan Lisa MD

## 2021-12-08 NOTE — PROGRESS NOTES
"Pt being transferred to [facility] unit 4A via [facility] on high flow. All belongings sent with pt. Pts cellphone, , phone case, watch and eye glasses all in pt belongings bag with pts shoes. Pt declines for writer to notify his daughter of transfer stating he will \"do it myself\".      Fabiola Carrington RN    "

## 2021-12-08 NOTE — H&P
MEDICAL ICU H&P  12/08/2021    Date of Hospital Admission: 12/8/21  Date of ICU Admission: 12/8/21  Reason for Critical Care Admission: hypoxemic respiratory failure  Date of Service (when I saw the patient): 12/08/2021    ASSESSMENT: Juan June is a 61 year old male with PMH dyslipidemia, EMILY, GERD who was admitted on 12/8/2021 acute hypoxemic respiratory failure secondary to COVID19 PNA.    PLAN:    Neuro:  # Pain and sedation  None.     Pulmonary:  # Acute hypoxemic respiratory failure secondary to COVID19 PNA  Admitted to [facility] 12/6 for hypoxic respiratory failure. Transferred to [facility] 12/8 for increased O2 requirements. CTA chest 12/6 with no PE, mutlifocal opacities. Euvolemic on exam. Tolerating high flow well.   - Covid treatment per below  - encouraged self proning  - cont HFNC, wean if able    Cardiovascular:  No acute concerns. Euvolemic. EKG QTc 397.     GI/Nutrition:  # elevated transaminases  Monitor. No abdominal pain. Inflammatory in setting of COVID19.    Full liquid diet.    Renal/Fluids/Electrolytes:  No acute concerns. Cr at baseline.    Endocrine:  No acute concerns.   Low dose ssi while on steroids.  Hypoglycemia protocol    ID:  # COVID19 PNA  Positive 12/6/21. CT chest imaging consistent with multifocal viral PNA. Elevated inflammatory markers and rising. CRP 45, , fibrinogen 521, ddimer 0.75. No signs or symptoms consistent with superimposed bacterial PNA at this time.  - Dexamethasone (12/6 -  12/15)  - Baricitinib 4 mg daily  - anticoag: enoxaparin 0.5 mg/kg BID    Hematology:    No active concerns    Musculoskeletal:  No active concerns    Skin:  No active concerns    General Cares/Prophylaxis:    DVT Prophylaxis: Enoxaparin (Lovenox) SQ  GI Prophylaxis: PPI  Restraints: none  Code Status: FULL    Lines/tubes/drains:  - PIV x2    Disposition:  - Medical ICU     Patient seen and findings/plan discussed with medical ICU staff, Dr. Munoz.    Juan Gipson  [facility]    -----------------------------------------------------------------------    HISTORY PRESENTING ILLNESS:     Transferred from [facility] for increased O2 requirements. On HFNC 60 L, 70% FiO2. Given increased needs, possible intubation, transferred to [facility] for ICU bed. He had been receiving dexamethasone and baricitinb.     Symptoms prior to hospitalization was loss of taste, SOB. Found SpO2 to be diminished, prompting ED evaluation.    On transfer he is breathing comfortably on HFNC 60 L 70%, speaking in full sentences. Denies SOB, chest pain, lightheadedness, abdominal pain, nausea, diarrhea. We discussed importance of continued self proning.    REVIEW OF SYSTEMS: 12 point ROS negative except for as above    PAST MEDICAL HISTORY:   No past medical history on file.  SURGICAL HISTORY:  No past surgical history on file.  SOCIAL HISTORY:  Social History     Socioeconomic History     Marital status: Single     Spouse name: Not on file     Number of children: Not on file     Years of education: Not on file     Highest education level: Not on file   Occupational History     Not on file   Tobacco Use     Smoking status: Former Smoker     Quit date: 1978     Years since quittin.6     Smokeless tobacco: Not on file   Substance and Sexual Activity     Alcohol use: Yes     Comment: rare     Drug use: Not on file     Sexual activity: Not on file   Other Topics Concern     Not on file   Social History Narrative     Not on file     Social Determinants of Health     Financial Resource Strain: Not on file   Food Insecurity: Not on file   Transportation Needs: Not on file   Physical Activity: Not on file   Stress: Not on file   Social Connections: Not on file   Intimate Partner Violence: Not on file   Housing Stability: Not on file     FAMILY HISTORY:   Family History   Problem Relation Age of Onset     Hypertension Mother         born 1937     Lipids Father         born 1933     Family History Negative  "Brother      ALLERGIES:   Allergies   Allergen Reactions     Penicillin V Unknown     Penicillins      \"?\" as child     MEDICATIONS:  No current facility-administered medications on file prior to encounter.  aspirin 81 MG EC tablet, Take 81 mg by mouth daily  omeprazole (PRILOSEC) 20 MG DR capsule, Take 20 mg by mouth daily  azithromycin (ZITHROMAX) 500 MG tablet, Take 500 mg by mouth daily  methylPREDNISolone (MEDROL DOSEPAK) 4 MG tablet therapy pack, Take 4 mg by mouth 2 times daily        PHYSICAL EXAMINATION:  Temp:  [98.1  F (36.7  C)-99.5  F (37.5  C)] 98.2  F (36.8  C)  Pulse:  [38-56] 53  Resp:  [0-44] 28  BP: ()/(53-80) 112/80  FiO2 (%):  [70 %-100 %] 70 %  SpO2:  [83 %-100 %] 93 %  General: no acute distress, nontoxic. Pleasant. Interactive.  HEENT: HFNC in place, anicteric sclera, MMM, EOMI  Neuro: A&Ox3, NAD  Pulm/Resp: Clear breath sounds bilaterally with minimal bilateral crackles. Speaking in full sentences on HFNC.   CV: RRR, no murmurs rubs or gallops. No jvd, no edema.  Abdomen: Soft, non-distended, non-tender  Incisions/Skin: no rashes or bruises    LABS: Reviewed.   Arterial Blood Gases   No lab results found in last 7 days.  Complete Blood Count   Recent Labs   Lab 12/08/21  0718 12/07/21  0830 12/06/21  1303   WBC 5.8 4.2 5.2   HGB 15.8 15.5 16.8    172  --      Basic Metabolic Panel  Recent Labs   Lab 12/08/21  0718 12/08/21  0436 12/07/21  0830 12/06/21  1457     --  136 133*   POTASSIUM 4.2  --  4.7 3.7   CHLORIDE 102  --  100 96*   CO2 26  --  23 26   BUN 23  --  20 21   CR 0.87  --  0.86 0.98   * 119* 116 97     Liver Function Tests  Recent Labs   Lab 12/08/21  0718 12/06/21  1457 12/06/21  1303   AST 89* 96*  --    ALT 75* 68*  --    ALKPHOS 51 47  --    BILITOTAL 1.2 0.9  --    ALBUMIN 2.7* 3.0*  --    INR  --   --  0.95     Coagulation Profile  Recent Labs   Lab 12/06/21  2151 12/06/21  1303   INR  --  0.95   PTT 36  --        IMAGING:  No results found for " this or any previous visit (from the past 24 hour(s)).

## 2021-12-08 NOTE — PLAN OF CARE
See vital signs and flow sheets for complete detailed information.     Pt transferred from outside hospital at 0400 am.   Neuro: Alert and oriented x4. Pt refusing pneumatic hoses and various other treatments. RAI with purpose. Denies any numbness or tingling x4 extremities.   Pulmonary: Increased SOB and dyspnea noted during talking and movement. Lungs coarse and diminished all fields. Started  on HHFNC at 70% and 60L currently on BiPAP 12/7 70%.   Cardiac: HR 40's while awake and max low 28 while asleep. B/p stable 100's systolic. Pt easily aroused to verbal stimuli and denies any c/o CP. Currently afebrile and no ectopy noted.   GI: Reports poor appetite and feeling of fullness. Last BM per pt Monday, Dec. 6th. BS active x4 quadrants. Denies any N/V at this time.   : Uses urinal and voids without difficulty.   Skin: Intact   IV: PIV x1 Right FA with good blood return and flushes easily.     Plan: Monitor pt and continue POC and notify MD of any changes or concerns.

## 2021-12-09 ENCOUNTER — APPOINTMENT (OUTPATIENT)
Dept: PHYSICAL THERAPY | Facility: CLINIC | Age: 61
DRG: 177 | End: 2021-12-09
Attending: ANESTHESIOLOGY
Payer: COMMERCIAL

## 2021-12-09 LAB
ALBUMIN SERPL-MCNC: 2.7 G/DL (ref 3.4–5)
ALP SERPL-CCNC: 52 U/L (ref 40–150)
ALT SERPL W P-5'-P-CCNC: 91 U/L (ref 0–70)
ANION GAP SERPL CALCULATED.3IONS-SCNC: 6 MMOL/L (ref 3–14)
AST SERPL W P-5'-P-CCNC: 78 U/L (ref 0–45)
BILIRUB DIRECT SERPL-MCNC: 0.3 MG/DL (ref 0–0.2)
BILIRUB SERPL-MCNC: 1.4 MG/DL (ref 0.2–1.3)
BUN SERPL-MCNC: 23 MG/DL (ref 7–30)
CALCIUM SERPL-MCNC: 8.7 MG/DL (ref 8.5–10.1)
CHLORIDE BLD-SCNC: 105 MMOL/L (ref 94–109)
CO2 SERPL-SCNC: 26 MMOL/L (ref 20–32)
CREAT SERPL-MCNC: 0.79 MG/DL (ref 0.66–1.25)
ERYTHROCYTE [DISTWIDTH] IN BLOOD BY AUTOMATED COUNT: 11.9 % (ref 10–15)
GFR SERPL CREATININE-BSD FRML MDRD: >90 ML/MIN/1.73M2
GLUCOSE BLD-MCNC: 90 MG/DL (ref 70–99)
GLUCOSE BLDC GLUCOMTR-MCNC: 110 MG/DL (ref 70–99)
GLUCOSE BLDC GLUCOMTR-MCNC: 123 MG/DL (ref 70–99)
GLUCOSE BLDC GLUCOMTR-MCNC: 91 MG/DL (ref 70–99)
HCT VFR BLD AUTO: 43 % (ref 40–53)
HGB BLD-MCNC: 14.8 G/DL (ref 13.3–17.7)
MAGNESIUM SERPL-MCNC: 2.4 MG/DL (ref 1.6–2.3)
MCH RBC QN AUTO: 31.6 PG (ref 26.5–33)
MCHC RBC AUTO-ENTMCNC: 34.4 G/DL (ref 31.5–36.5)
MCV RBC AUTO: 92 FL (ref 78–100)
PHOSPHATE SERPL-MCNC: 2.8 MG/DL (ref 2.5–4.5)
PLATELET # BLD AUTO: 250 10E3/UL (ref 150–450)
POTASSIUM BLD-SCNC: 4.5 MMOL/L (ref 3.4–5.3)
PROT SERPL-MCNC: 6.8 G/DL (ref 6.8–8.8)
RBC # BLD AUTO: 4.69 10E6/UL (ref 4.4–5.9)
SODIUM SERPL-SCNC: 137 MMOL/L (ref 133–144)
WBC # BLD AUTO: 7.5 10E3/UL (ref 4–11)

## 2021-12-09 PROCEDURE — 250N000013 HC RX MED GY IP 250 OP 250 PS 637: Performed by: STUDENT IN AN ORGANIZED HEALTH CARE EDUCATION/TRAINING PROGRAM

## 2021-12-09 PROCEDURE — 36415 COLL VENOUS BLD VENIPUNCTURE: CPT | Performed by: STUDENT IN AN ORGANIZED HEALTH CARE EDUCATION/TRAINING PROGRAM

## 2021-12-09 PROCEDURE — 84100 ASSAY OF PHOSPHORUS: CPT | Performed by: STUDENT IN AN ORGANIZED HEALTH CARE EDUCATION/TRAINING PROGRAM

## 2021-12-09 PROCEDURE — 80053 COMPREHEN METABOLIC PANEL: CPT | Performed by: STUDENT IN AN ORGANIZED HEALTH CARE EDUCATION/TRAINING PROGRAM

## 2021-12-09 PROCEDURE — 250N000011 HC RX IP 250 OP 636: Performed by: STUDENT IN AN ORGANIZED HEALTH CARE EDUCATION/TRAINING PROGRAM

## 2021-12-09 PROCEDURE — 120N000003 HC R&B IMCU UMMC

## 2021-12-09 PROCEDURE — 85027 COMPLETE CBC AUTOMATED: CPT | Performed by: STUDENT IN AN ORGANIZED HEALTH CARE EDUCATION/TRAINING PROGRAM

## 2021-12-09 PROCEDURE — 999N000157 HC STATISTIC RCP TIME EA 10 MIN

## 2021-12-09 PROCEDURE — 97530 THERAPEUTIC ACTIVITIES: CPT | Mod: GP

## 2021-12-09 PROCEDURE — 99233 SBSQ HOSP IP/OBS HIGH 50: CPT | Performed by: STUDENT IN AN ORGANIZED HEALTH CARE EDUCATION/TRAINING PROGRAM

## 2021-12-09 PROCEDURE — 82248 BILIRUBIN DIRECT: CPT | Performed by: STUDENT IN AN ORGANIZED HEALTH CARE EDUCATION/TRAINING PROGRAM

## 2021-12-09 PROCEDURE — 97162 PT EVAL MOD COMPLEX 30 MIN: CPT | Mod: GP

## 2021-12-09 PROCEDURE — 250N000012 HC RX MED GY IP 250 OP 636 PS 637: Performed by: STUDENT IN AN ORGANIZED HEALTH CARE EDUCATION/TRAINING PROGRAM

## 2021-12-09 PROCEDURE — 83735 ASSAY OF MAGNESIUM: CPT | Performed by: STUDENT IN AN ORGANIZED HEALTH CARE EDUCATION/TRAINING PROGRAM

## 2021-12-09 RX ORDER — ACETAMINOPHEN 325 MG/1
325 TABLET ORAL EVERY 4 HOURS PRN
Status: DISCONTINUED | OUTPATIENT
Start: 2021-12-09 | End: 2021-12-22 | Stop reason: HOSPADM

## 2021-12-09 RX ORDER — SENNOSIDES 8.6 MG
8.6 TABLET ORAL 2 TIMES DAILY PRN
Status: DISCONTINUED | OUTPATIENT
Start: 2021-12-09 | End: 2021-12-22 | Stop reason: HOSPADM

## 2021-12-09 RX ORDER — ONDANSETRON 4 MG/1
4 TABLET, ORALLY DISINTEGRATING ORAL EVERY 6 HOURS PRN
Status: DISCONTINUED | OUTPATIENT
Start: 2021-12-09 | End: 2021-12-22 | Stop reason: HOSPADM

## 2021-12-09 RX ORDER — POLYETHYLENE GLYCOL 3350 17 G/17G
17 POWDER, FOR SOLUTION ORAL DAILY PRN
Status: DISCONTINUED | OUTPATIENT
Start: 2021-12-09 | End: 2021-12-22 | Stop reason: HOSPADM

## 2021-12-09 RX ADMIN — ENOXAPARIN SODIUM 50 MG: 60 INJECTION SUBCUTANEOUS at 07:29

## 2021-12-09 RX ADMIN — DEXAMETHASONE 6 MG: 2 TABLET ORAL at 07:29

## 2021-12-09 RX ADMIN — ACETAMINOPHEN 325 MG: 325 TABLET, FILM COATED ORAL at 16:17

## 2021-12-09 RX ADMIN — BARICITINIB 4 MG: 2 TABLET, FILM COATED ORAL at 07:29

## 2021-12-09 RX ADMIN — PANTOPRAZOLE SODIUM 40 MG: 40 TABLET, DELAYED RELEASE ORAL at 07:29

## 2021-12-09 ASSESSMENT — ACTIVITIES OF DAILY LIVING (ADL)
ADLS_ACUITY_SCORE: 3
ADLS_ACUITY_SCORE: 4
ADLS_ACUITY_SCORE: 3
ADLS_ACUITY_SCORE: 4
ADLS_ACUITY_SCORE: 3
ADLS_ACUITY_SCORE: 4
ADLS_ACUITY_SCORE: 4
ADLS_ACUITY_SCORE: 3
ADLS_ACUITY_SCORE: 4
ADLS_ACUITY_SCORE: 5
ADLS_ACUITY_SCORE: 4
ADLS_ACUITY_SCORE: 3
ADLS_ACUITY_SCORE: 4
ADLS_ACUITY_SCORE: 3
ADLS_ACUITY_SCORE: 3
ADLS_ACUITY_SCORE: 4
ADLS_ACUITY_SCORE: 3

## 2021-12-09 NOTE — PROGRESS NOTES
12/09/21 0901   Quick Adds   Type of Visit Initial PT Evaluation       Present no   Living Environment   People in home child(fabrizio), adult   Current Living Arrangements house   Home Accessibility stairs within home   Number of Stairs, Within Home, Primary 10   Stair Railings, Within Home, Primary railing on right side (ascending)   Transportation Anticipated car, drives self   Living Environment Comments PT: Pt lives in [place] w/ daughter in a house with flight to reach second level.    Self-Care   Usual Activity Tolerance excellent   Current Activity Tolerance moderate   Regular Exercise Yes   Activity/Exercise Type other (see comments)  (gym)   Exercise Amount/Frequency 3-5 times/wk   Equipment Currently Used at Home none   Activity/Exercise/Self-Care Comment PT: Pt IND w/ mobility and ADLs at baseline, active job as a [employer] .    Disability/Function   Hearing Difficulty or Deaf no   Wear Glasses or Blind no   Concentrating, Remembering or Making Decisions Difficulty no   Difficulty Communicating no   Difficulty Eating/Swallowing no   Walking or Climbing Stairs Difficulty no   Dressing/Bathing Difficulty no   Toileting issues no   Doing Errands Independently Difficulty (such as shopping) yes   Fall history within last six months no   Change in Functional Status Since Onset of Current Illness/Injury yes   General Information   Onset of Illness/Injury or Date of Surgery 12/08/21   Referring Physician Chalino Sandy MD   Patient/Family Therapy Goals Statement (PT) to return to baseline function   Pertinent History of Current Problem (include personal factors and/or comorbidities that impact the POC) Juan June is a 61 year old male with PMH dyslipidemia, EMILY, GERD who was admitted on 12/8/2021 acute hypoxemic respiratory failure secondary to COVID19 PNA.   Existing Precautions/Restrictions oxygen therapy device and L/min;fall   Heart Disease Risk Factors  Age;Gender   General Observations 70% HFNC throughout eval   Cognition   Orientation Status (Cognition) oriented x 4   Affect/Mental Status (Cognition) WNL   Follows Commands (Cognition) WNL   Pain Assessment   Patient Currently in Pain Yes, see Vital Sign flowsheet  (back pain)   Integumentary/Edema   Integumentary/Edema no deficits were identifed   Posture    Posture Not impaired   Range of Motion (ROM)   ROM Comment BUE/LE WFL   Strength   Strength Comments no evidence of deconditioning, 5/5 BLE strength   Bed Mobility   Comment (Bed Mobility) SBA   Transfers   Transfer Safety Comments SBA   Gait/Stairs (Locomotion)   Comment (Gait/Stairs) NT, medical status   Balance   Balance Comments good static and dynamic standing balance, IND sitting EOB   Sensory Examination   Sensory Perception patient reports no sensory changes   Coordination   Coordination no deficits were identified   Muscle Tone   Muscle Tone no deficits were identified   Clinical Impression   Criteria for Skilled Therapeutic Intervention yes, treatment indicated   PT Diagnosis (PT) impaired functional activity tolerance   Influenced by the following impairments medical status   Functional limitations due to impairments decreased (I) w/ transfers, gait, tolerance to ADLs   Clinical Presentation Evolving/Changing   Clinical Presentation Rationale current status, medical status, clinical judgement   Clinical Decision Making (Complexity) moderate complexity   Therapy Frequency (PT) 4x/week   Predicted Duration of Therapy Intervention (days/wks) 1 week   Planned Therapy Interventions (PT) gait training;home exercise program;patient/family education;stair training;strengthening;transfer training;progressive activity/exercise;risk factor education;home program guidelines;balance training   Anticipated Equipment Needs at Discharge (PT)   (TBD)   Risk & Benefits of therapy have been explained evaluation/treatment results reviewed;care plan/treatment goals  reviewed;risks/benefits reviewed;current/potential barriers reviewed;participants voiced agreement with care plan;participants included;patient   Clinical Impression Comments PT: Pt presents with good functional strength and balance however decreased functional IND and activity tolerance 2/2 medical status, high O2 needs. Pt will benefit from skilled PT while inpatient to prevent deconditioning, promote safe mobilization and faciliate successful discharge.   PT Discharge Planning    PT Discharge Recommendation (DC Rec) home with outpatient physical therapy  (pulm rehab)   PT Rationale for DC Rec Pt limited by medical status and O2 needs at this time, anticipate will be able to discharge home when medically ready. OP Pulm rehab to address CP endurance and activity tolerance.    PT Brief overview of current status  SBA bed<>chair    Total Evaluation Time   Total Evaluation Time (Minutes) 5

## 2021-12-09 NOTE — PLAN OF CARE
Major Shift Events:  Pt had stable VS entirety of shift, though HR persistently < 60.  SpO2 tolerated both HFNC while proning and BiPAP while supine.  He actually had higher SpO2 values when returning to supine on HFNC, but requested BiPAP because he was previously educated that it was superior and will help him get back to optimal health faster.  Finally attempted to eat small snack food items at end of shift.  Minimal drinking, but producing urine.  Given his Covid diagnosis, fluids were not pushed this shift.    Able to move in bed/prone without assistance.    Plan: transfer to floor tomorrow.

## 2021-12-09 NOTE — PLAN OF CARE
"See vital signs and flow sheets for complete detailed information.      Neuro: Alert and oriented x4. RAI with purpose. Denies any numbness or tingling x4 extremities.   Pulmonary: Increased SOB and dyspnea noted during talking and movement. Lungs coarse and diminished all fields. Currently on BiPAP 12/7 70% with O2 sats > 90%.  Cardiac: HR 30\"s to 40's this shift and MD aware. B/p stable 100's systolic. Pt easily aroused to verbal stimuli and denies any c/o CP. Remains afebrile.   GI: Reports poor appetite and feeling of fullness. Last BM per pt Monday, Dec. 6th. BS active x4 quadrants. Denies any N/V at this time.   : Uses urinal and urine dark matilde in color.   Skin: Intact   IV: PIV x1 Right FA with good blood return and flushes easily.      Plan: Monitor pt and continue POC and notify MD of any changes or concerns.   "

## 2021-12-09 NOTE — PROGRESS NOTES
[facility]    Progress Note - [facility] Service        Date of Admission:  12/8/2021    Assessment & Plan   Juan June is a 61 year old male with PMH dyslipidemia, EMILY, GERD who was admitted on 12/8/2021 acute hypoxemic respiratory failure secondary to COVID19 PNA. His oxygen needs remain stable on HFNC and he remains hemodynamically stable.    #Acute Hypoxic Respiratory Failure 2/2 Covid-19 PNA:  Admitted to [facility] on 12/6 for acute hypoxia. Per EMR, it appears pt's first symptoms were around 12/1. Positive confirmed test on 12/6, however, EMR also notes in writing first confirmed test around 12/1. He was started on dexamethasone, but declined remdesivir. His oxygen requirements have been stable over the past 24 hours. D. Dimer 0.075.   -Covid-19 therapies:   -c/w dexamethasone    -c/w barencitinib   -Pt declined remdesivir  -DVT ppx:   -Lovenox qday  -Low suspicion for superimposed bacterial infection   -Respiratory culture w/ gram stain pending  -Oxygen support:   -Remains stable on 70% FiO2 at 40 L  -Pt continues to self-prone as tolerated    #Elevated liver enzymes  #Elevated Direct Bilirubin  Minimal elevation of direct bilirubin. ALT and AST are also elevated. Alkaline phosphatase is normal. Pt does not endorse any abdominal pain. No hx of elevated liver enzymes. Likely 2/2 covid-19 inflammation.   -CMP tomorrow morning  -Consider RUQ ultrasound if worsening enzymes or abdominal pain.     #GERD:  Takes omeprazole at home. Placed on protonix in ICU  -c/w pantoprazole 40mg daily.        Diet: Regular Diet Adult    DVT Prophylaxis: Enoxaparin (Lovenox) SQ  Correa Catheter: Not present  Fluids: None  Central Lines: None  Code Status: Full Code      Disposition Plan   Expected Discharge: 12/13/2021      The patient's care was discussed with the Attending Physician, Dr. Franz.    MD [facility] Mccarthy 2 Service  [facility]  [facility]  Securely message with the Vocera Web Console (learn more here)  Text page via [facility] Paging/Directory    Please see sign in/sign out for up to date coverage information  ______________________________________________________________________    Interval History   -Transferred to [facility] today  -pt notes he is overall feeling quite well. He does not feel dyspneic. Does not have a cough. No abdominal pain.   -Started eating clear liquids and pudding today and is tolerating it well.    Data reviewed today: I reviewed all medications, new labs and imaging results over the last 24 hours.    Physical Exam   Vital Signs: Temp: 98.1  F (36.7  C) Temp src: Axillary BP: 109/83 Pulse: 61   Resp: 26 SpO2: 94 % O2 Device: BiPAP/CPAP    Weight: 0 lbs 0 oz  General Appearance: Awake, prone, engages in conversation, nontoxic  Respiratory: Coarse crackles through BL lower lung fields. Decreased aeration in basilar fields. Breathing comfortably on HFNC.   Cardiovascular: RRR, normal S1/S2  GI: Not examined; pt prone  Skin: No rashes on visualized portions of skin.  Neuro: AOx3, spontaneously moving all extremities.  Psych: Pleasant, appropriate speech/language    Data   Recent Labs   Lab 12/09/21  1626 12/09/21  0948 12/09/21  0839 12/09/21  0513 12/08/21  0840 12/08/21  0718 12/08/21  0436 12/07/21  0830 12/06/21  1457 12/06/21  1303   WBC  --   --   --  7.5  --  5.8  --  4.2  --  5.2   HGB  --   --   --  14.8  --  15.8  --  15.5  --  16.8   MCV  --   --   --  92  --  92  --  91  --  90   PLT  --   --   --  250  --  232  --  172  --   --    INR  --   --   --   --   --   --   --   --   --  0.95   NA  --  137  --   --   --  136  --  136   < >  --    POTASSIUM  --  4.5  --   --   --  4.2  --  4.7   < >  --    CHLORIDE  --  105  --   --   --  102  --  100   < >  --    CO2  --  26  --   --   --  26  --  23   < >  --    BUN  --  23  --   --   --  23  --  20   < >  --    CR  --  0.79  --   --   --  0.87  --  0.86   <  >  --    ANIONGAP  --  6  --   --   --  8  --  13   < >  --    AVERY  --  8.7  --   --   --  8.5  --  8.5   < >  --    * 90 91  --    < > 118*   < > 116   < >  --    ALBUMIN  --  2.7*  --   --   --  2.7*  --   --    < >  --    PROTTOTAL  --  6.8  --   --   --  6.8  --   --    < >  --    BILITOTAL  --  1.4*  --   --   --  1.2  --   --    < >  --    ALKPHOS  --  52  --   --   --  51  --   --    < >  --    ALT  --  91*  --   --   --  75*  --   --    < >  --    AST  --  78*  --   --   --  89*  --   --    < >  --     < > = values in this interval not displayed.     No results found for this or any previous visit (from the past 24 hour(s)).

## 2021-12-09 NOTE — PROGRESS NOTES
MEDICAL ICU PROGRESS NOTE  12/09/2021    Date of Hospital Admission: 12/8/21  Date of ICU Admission: 12/8/21  Reason for Critical Care Admission: hypoxemic respiratory failure  Date of Service (when I saw the patient): 12/09/2021    ASSESSMENT: Juan June is a 61 year old male with PMH dyslipidemia, EMILY, GERD who was admitted on 12/8/2021 acute hypoxemic respiratory failure secondary to COVID19 PNA.    PLAN:    Neuro:  # Pain and sedation  None. Denies pain, following commands self proning    Pulmonary:  # Acute hypoxemic respiratory failure secondary to COVID19 PNA  Admitted to [facility] 12/6 for hypoxic respiratory failure. Transferred to [facility] 12/8 for increased O2 requirements. CTA chest 12/6 with no PE, mutlifocal opacities. Euvolemic on exam. Tolerating high flow well.   - Covid treatment per below  - encouraged self proning  - cont HFNC, wean if able  - intermittent BiPAP use     Cardiovascular:  No acute concerns. Euvolemic. EKG QTc 397.   Bradycardic at baseline low 40's during sleep will intermittently drop in the high 20's but rapidly bounces back  No signs of hypoperfusion     GI/Nutrition:  # elevated transaminases  Monitor. No abdominal pain. Inflammatory in setting of COVID19.    Full liquid diet.    Renal/Fluids/Electrolytes:  No acute concerns. Cr at baseline.    Endocrine:  No acute concerns.   Low dose ssi while on steroids.  Hypoglycemia protocol    ID:  # COVID19 PNA  Positive 12/6/21. CT chest imaging consistent with multifocal viral PNA. Elevated inflammatory markers and rising. CRP 45, , fibrinogen 521, ddimer 0.75. No signs or symptoms consistent with superimposed bacterial PNA at this time.  - Dexamethasone (12/6 -  12/15)  - Baricitinib 4 mg daily  - anticoag: enoxaparin 0.5 mg/kg BID    Hematology:    No active concerns    Musculoskeletal:  No active concerns    Skin:  No active concerns    General Cares/Prophylaxis:    DVT Prophylaxis: Enoxaparin (Lovenox) SQ  GI  Prophylaxis: PPI  Restraints: none  Code Status: FULL    Lines/tubes/drains:  - PIV x2    Disposition:  - Medical ICU     Critical Care Services Progress Note:     I personally examined and evaluated the patient today.   The patient s prognosis today is unchanged  I have evaluated all laboratory values and imaging studies from the past 24 hours.  Key findings and decisions made today included Stable sats overnight, tolerates BiPAP intermittently 2/2 to comfort, maintains sats on HFNC, given 48 hours of stable sats. On transfer to the floor  I personally managed the non-invasive positive pressure ventilator.   Consults ongoing and ordered are none  Procedures that will happen today are: none  All treatments were placed at my direction.  I formulated today s plan with the house staff team or resident(s) and agree with the findings and plan in the associated note.    The above plans and care have been discussed with self and all questions and concerns were addressed.       Juan Lisa MD      -----------------------------------------------------------------------    HISTORY PRESENTING ILLNESS:     Transferred from [facility] for increased O2 requirements. On HFNC 60 L, 70% FiO2. Given increased needs, possible intubation, transferred to [facility] for ICU bed. He had been receiving dexamethasone and baricitinb.     Symptoms prior to hospitalization was loss of taste, SOB. Found SpO2 to be diminished, prompting ED evaluation.    On transfer he is breathing comfortably on HFNC 60 L 70%, speaking in full sentences. Denies SOB, chest pain, lightheadedness, abdominal pain, nausea, diarrhea. We discussed importance of continued self proning.    REVIEW OF SYSTEMS: 12 point ROS negative except for as above    PAST MEDICAL HISTORY:   No past medical history on file.  SURGICAL HISTORY:  No past surgical history on file.  SOCIAL HISTORY:  Social History     Socioeconomic History     Marital status: Single     Spouse name: Not  "on file     Number of children: Not on file     Years of education: Not on file     Highest education level: Not on file   Occupational History     Not on file   Tobacco Use     Smoking status: Former Smoker     Quit date: 1978     Years since quittin.6     Smokeless tobacco: Not on file   Substance and Sexual Activity     Alcohol use: Yes     Comment: rare     Drug use: Not on file     Sexual activity: Not on file   Other Topics Concern     Not on file   Social History Narrative     Not on file     Social Determinants of Health     Financial Resource Strain: Not on file   Food Insecurity: Not on file   Transportation Needs: Not on file   Physical Activity: Not on file   Stress: Not on file   Social Connections: Not on file   Intimate Partner Violence: Not on file   Housing Stability: Not on file     FAMILY HISTORY:   Family History   Problem Relation Age of Onset     Hypertension Mother         born 1937     Lipids Father         born 1933     Family History Negative Brother      ALLERGIES:   Allergies   Allergen Reactions     Penicillin V Unknown     Penicillins      \"?\" as child     MEDICATIONS:  No current facility-administered medications on file prior to encounter.  omeprazole (PRILOSEC) 20 MG DR capsule, Take 20 mg by mouth daily        PHYSICAL EXAMINATION:  Temp:  [97.7  F (36.5  C)-98  F (36.7  C)] 97.7  F (36.5  C)  Pulse:  [37-61] 46  Resp:  [18-28] 26  BP: ()/(61-82) 114/78  FiO2 (%):  [70 %] 70 %  SpO2:  [91 %-100 %] 98 %  General: no acute distress, nontoxic. Pleasant. Interactive.  HEENT: HFNC in place, anicteric sclera, MMM, EOMI  Neuro: A&Ox3, NAD  Pulm/Resp: Clear breath sounds bilaterally with minimal bilateral crackles. Speaking in full sentences on HFNC.   CV: RRR, no murmurs rubs or gallops. No jvd, no edema.  Abdomen: Soft, non-distended, non-tender  Incisions/Skin: no rashes or bruises    LABS: Reviewed.   Arterial Blood Gases   No lab results found in last 7 days.  Complete " Blood Count   Recent Labs   Lab 12/09/21  0513 12/08/21  0718 12/07/21  0830 12/06/21  1303   WBC 7.5 5.8 4.2 5.2   HGB 14.8 15.8 15.5 16.8    232 172  --      Basic Metabolic Panel  Recent Labs   Lab 12/09/21  0839 12/08/21  2106 12/08/21  1221 12/08/21  0840 12/08/21  0718 12/08/21  0436 12/07/21  0830 12/06/21  1457   NA  --   --   --   --  136  --  136 133*   POTASSIUM  --   --   --   --  4.2  --  4.7 3.7   CHLORIDE  --   --   --   --  102  --  100 96*   CO2  --   --   --   --  26  --  23 26   BUN  --   --   --   --  23  --  20 21   CR  --   --   --   --  0.87  --  0.86 0.98   GLC 91 118* 105* 115* 118*   < > 116 97    < > = values in this interval not displayed.     Liver Function Tests  Recent Labs   Lab 12/08/21  0718 12/06/21  1457 12/06/21  1303   AST 89* 96*  --    ALT 75* 68*  --    ALKPHOS 51 47  --    BILITOTAL 1.2 0.9  --    ALBUMIN 2.7* 3.0*  --    INR  --   --  0.95     Coagulation Profile  Recent Labs   Lab 12/06/21  2151 12/06/21  1303   INR  --  0.95   PTT 36  --        IMAGING:  No results found for this or any previous visit (from the past 24 hour(s)).

## 2021-12-10 ENCOUNTER — APPOINTMENT (OUTPATIENT)
Dept: PHYSICAL THERAPY | Facility: CLINIC | Age: 61
DRG: 177 | End: 2021-12-10
Attending: ANESTHESIOLOGY
Payer: COMMERCIAL

## 2021-12-10 LAB
ALBUMIN SERPL-MCNC: 2.5 G/DL (ref 3.4–5)
ALP SERPL-CCNC: 52 U/L (ref 40–150)
ALT SERPL W P-5'-P-CCNC: 90 U/L (ref 0–70)
ANION GAP SERPL CALCULATED.3IONS-SCNC: 4 MMOL/L (ref 3–14)
AST SERPL W P-5'-P-CCNC: 60 U/L (ref 0–45)
BILIRUB SERPL-MCNC: 1.2 MG/DL (ref 0.2–1.3)
BUN SERPL-MCNC: 25 MG/DL (ref 7–30)
CALCIUM SERPL-MCNC: 8.2 MG/DL (ref 8.5–10.1)
CHLORIDE BLD-SCNC: 107 MMOL/L (ref 94–109)
CO2 SERPL-SCNC: 25 MMOL/L (ref 20–32)
CREAT SERPL-MCNC: 0.77 MG/DL (ref 0.66–1.25)
CRP SERPL-MCNC: 16 MG/L (ref 0–8)
D DIMER PPP FEU-MCNC: 0.57 UG/ML FEU (ref 0–0.5)
ERYTHROCYTE [DISTWIDTH] IN BLOOD BY AUTOMATED COUNT: 11.9 % (ref 10–15)
FIBRINOGEN PPP-MCNC: 449 MG/DL (ref 170–490)
GFR SERPL CREATININE-BSD FRML MDRD: >90 ML/MIN/1.73M2
GLUCOSE BLD-MCNC: 88 MG/DL (ref 70–99)
GLUCOSE BLDC GLUCOMTR-MCNC: 131 MG/DL (ref 70–99)
GLUCOSE BLDC GLUCOMTR-MCNC: 134 MG/DL (ref 70–99)
GLUCOSE BLDC GLUCOMTR-MCNC: 84 MG/DL (ref 70–99)
HCT VFR BLD AUTO: 42.7 % (ref 40–53)
HGB BLD-MCNC: 14.8 G/DL (ref 13.3–17.7)
INR PPP: 1.03 (ref 0.85–1.15)
MCH RBC QN AUTO: 31.7 PG (ref 26.5–33)
MCHC RBC AUTO-ENTMCNC: 34.7 G/DL (ref 31.5–36.5)
MCV RBC AUTO: 91 FL (ref 78–100)
PLATELET # BLD AUTO: 254 10E3/UL (ref 150–450)
POTASSIUM BLD-SCNC: 4.3 MMOL/L (ref 3.4–5.3)
PROT SERPL-MCNC: 6.1 G/DL (ref 6.8–8.8)
RBC # BLD AUTO: 4.67 10E6/UL (ref 4.4–5.9)
SODIUM SERPL-SCNC: 136 MMOL/L (ref 133–144)
WBC # BLD AUTO: 7.9 10E3/UL (ref 4–11)

## 2021-12-10 PROCEDURE — 94660 CPAP INITIATION&MGMT: CPT

## 2021-12-10 PROCEDURE — 36415 COLL VENOUS BLD VENIPUNCTURE: CPT | Performed by: STUDENT IN AN ORGANIZED HEALTH CARE EDUCATION/TRAINING PROGRAM

## 2021-12-10 PROCEDURE — 97110 THERAPEUTIC EXERCISES: CPT | Mod: GP

## 2021-12-10 PROCEDURE — 80053 COMPREHEN METABOLIC PANEL: CPT | Performed by: STUDENT IN AN ORGANIZED HEALTH CARE EDUCATION/TRAINING PROGRAM

## 2021-12-10 PROCEDURE — 250N000013 HC RX MED GY IP 250 OP 250 PS 637: Performed by: STUDENT IN AN ORGANIZED HEALTH CARE EDUCATION/TRAINING PROGRAM

## 2021-12-10 PROCEDURE — 85379 FIBRIN DEGRADATION QUANT: CPT | Performed by: STUDENT IN AN ORGANIZED HEALTH CARE EDUCATION/TRAINING PROGRAM

## 2021-12-10 PROCEDURE — 999N000215 HC STATISTIC HFNC ADULT NON-CPAP

## 2021-12-10 PROCEDURE — 85027 COMPLETE CBC AUTOMATED: CPT | Performed by: STUDENT IN AN ORGANIZED HEALTH CARE EDUCATION/TRAINING PROGRAM

## 2021-12-10 PROCEDURE — 85384 FIBRINOGEN ACTIVITY: CPT | Performed by: STUDENT IN AN ORGANIZED HEALTH CARE EDUCATION/TRAINING PROGRAM

## 2021-12-10 PROCEDURE — 120N000003 HC R&B IMCU UMMC

## 2021-12-10 PROCEDURE — 999N000157 HC STATISTIC RCP TIME EA 10 MIN

## 2021-12-10 PROCEDURE — 250N000011 HC RX IP 250 OP 636

## 2021-12-10 PROCEDURE — 85610 PROTHROMBIN TIME: CPT | Performed by: STUDENT IN AN ORGANIZED HEALTH CARE EDUCATION/TRAINING PROGRAM

## 2021-12-10 PROCEDURE — 86140 C-REACTIVE PROTEIN: CPT | Performed by: STUDENT IN AN ORGANIZED HEALTH CARE EDUCATION/TRAINING PROGRAM

## 2021-12-10 PROCEDURE — 82040 ASSAY OF SERUM ALBUMIN: CPT | Performed by: STUDENT IN AN ORGANIZED HEALTH CARE EDUCATION/TRAINING PROGRAM

## 2021-12-10 PROCEDURE — 999N000043 HC STATISTIC CTO2 CONT OXYGEN TECH TIME EA 90 MIN

## 2021-12-10 PROCEDURE — 99232 SBSQ HOSP IP/OBS MODERATE 35: CPT | Mod: GC | Performed by: PEDIATRICS

## 2021-12-10 PROCEDURE — 250N000012 HC RX MED GY IP 250 OP 636 PS 637: Performed by: STUDENT IN AN ORGANIZED HEALTH CARE EDUCATION/TRAINING PROGRAM

## 2021-12-10 RX ORDER — LIDOCAINE 4 G/G
1 PATCH TOPICAL
Status: DISCONTINUED | OUTPATIENT
Start: 2021-12-10 | End: 2021-12-15

## 2021-12-10 RX ADMIN — PANTOPRAZOLE SODIUM 40 MG: 40 TABLET, DELAYED RELEASE ORAL at 08:23

## 2021-12-10 RX ADMIN — BARICITINIB 4 MG: 2 TABLET, FILM COATED ORAL at 08:23

## 2021-12-10 RX ADMIN — ENOXAPARIN SODIUM 40 MG: 40 INJECTION SUBCUTANEOUS at 08:24

## 2021-12-10 RX ADMIN — DEXAMETHASONE 6 MG: 2 TABLET ORAL at 08:23

## 2021-12-10 ASSESSMENT — ACTIVITIES OF DAILY LIVING (ADL)
ADLS_ACUITY_SCORE: 4
ADLS_ACUITY_SCORE: 4
ADLS_ACUITY_SCORE: 6
ADLS_ACUITY_SCORE: 4
ADLS_ACUITY_SCORE: 6
ADLS_ACUITY_SCORE: 4
ADLS_ACUITY_SCORE: 6
ADLS_ACUITY_SCORE: 4
ADLS_ACUITY_SCORE: 4
ADLS_ACUITY_SCORE: 6
ADLS_ACUITY_SCORE: 4
ADLS_ACUITY_SCORE: 6
ADLS_ACUITY_SCORE: 4
ADLS_ACUITY_SCORE: 6
ADLS_ACUITY_SCORE: 4
ADLS_ACUITY_SCORE: 4
ADLS_ACUITY_SCORE: 6
ADLS_ACUITY_SCORE: 6

## 2021-12-10 ASSESSMENT — MIFFLIN-ST. JEOR: SCORE: 1642.38

## 2021-12-10 NOTE — PLAN OF CARE
Neuro: A&Ox4. Calm and cooperative. PERRLA.    Cardiac: SB. HR ranging in 30-60s, notify provider if it is below 30. -130s.    Respiratory: Sating 95% on HFNC 90%/50 LPM. Pt has not needed BiPAP tonight.    GI/: Adequate urine output. No BM this shift. LBM 12/6    Diet/appetite: Tolerating Regular diet. Pt refused snacks overnight. ACHS blood glucose checks.    Activity:  Stand-by Assist/Independent when standing to use urinal.    Pain: Pt has denied pain this shift.     Skin: No new deficits noted.    LDA's: HFNC 90%/50 LPM. R-PIV, saline locked.    Plan: Continue with POC. Notify primary team with changes.

## 2021-12-10 NOTE — PLAN OF CARE
Neuro: A&Ox4.   Cardiac: SR/SB. VSS.   Respiratory: Sating > 90% on 80% HFNC. 100% w activity   GI/: Adequate urine output. Urinal at bedside   Diet/appetite: Tolerating reg diet. Eating well.  Activity:  Assist of 1, up to chair, pt doing exercises at bedside    Pain: At acceptable level on current regimen.   Skin: No new deficits noted.  LDA's: PIV    Plan: Continue with POC. Notify primary team with changes.

## 2021-12-10 NOTE — PROGRESS NOTES
Transferred to: 6b at (1930)  Belongings: three patient belonging bags sent with patient.  One [employer] bag sent as well.  Phone , phone, shaver with , etc all sent with patient.  No belongings evident in room after leaving.   Correa removed? N/a  Central line removed? N/a  Chart and medications sent with patient Yes  Family notified: No, states he will call daughter.

## 2021-12-10 NOTE — PLAN OF CARE
Transfer  Transferred from:   Via:bed  Reason for transfer: Pt appropriate for 6B- improved patient condition  Family: Pt states he will notify daughter  Belongings: Received with pt  Chart: Received with pt  Medications: Meds received from old unit with pt  Code Status verified on armband: yes  2 RN Skin Assessment Completed By: Vasu MCNAMARA & Marisa MCNAMARA  Med rec completed: yes (Pharm complete)  Bed surface reassessed with algorithm and charted: yes  New bed surface ordered: no  Suction/Ambu bag/Flowmeter at bedside: yes    Report received from: Gurinder MCNAMARA  Pt status:  Vital signs:  Temp: 98  F (36.7  C) Temp src: Oral BP: 102/62 Pulse: 54   Resp: 18 SpO2: 91 % O2 Device: High Flow Nasal Cannula (HFNC) Oxygen Delivery: 45 LPM

## 2021-12-10 NOTE — PLAN OF CARE
Major Shift Events:  Pt able to stay on HHFNC without need to transition to BiPAP.  Electively proned self.  VSS, though bradycardia persists.    Plan: Transfer to  very soon.

## 2021-12-10 NOTE — PROGRESS NOTES
[facility]    Progress Note - [facility] 2 Service        Date of Admission:  12/8/2021    Assessment & Plan   Juan June is a 61 year old male with PMH dyslipidemia, EMILY, GERD who was admitted on 12/8/2021 acute hypoxemic respiratory failure secondary to COVID19 PNA. His oxygen needs remain stable on HFNC and he remains hemodynamically stable.    Today:  -c/w dexamethasone + barencitinib  -discontinue insulin as pt has not required while on steroids.     #Acute Hypoxic Respiratory Failure 2/2 Covid-19 PNA:  Admitted to [facility] on 12/6 for acute hypoxia. Per EMR, it appears pt's first symptoms were around 12/1. Positive confirmed test on 12/6, however, EMR also notes in writing first confirmed test around 12/1. He was started on dexamethasone, but declined remdesivir. His oxygen requirements have been relatively stable over the past 24 hours. D. Dimer 0.075 on admission. CRP decreased to 16 from 45.   -Covid-19 therapies:   -c/w dexamethasone    -c/w barencitinib   -Pt declined remdesivir  -DVT ppx:   -Lovenox qday  -Low suspicion for superimposed bacterial infection   -Respiratory culture w/ gram stain pending   -No indication for current antibiotic therapy.  -Oxygen support:   -Remains stable on 90% FiO2 at 50 L  -Pt continues to self-prone as tolerated    #Elevated liver enzymes  ALT and AST elevated on admission- these have remained stable overnight. Alkaline phosphatase is normal. Pt does not endorse any abdominal pain. No hx of elevated liver enzymes. Likely 2/2 covid-19 inflammation.   -CMP tomorrow   -Consider RUQ ultrasound if worsening enzymes or abdominal pain.     #GERD:  Takes omeprazole at home. Placed on protonix in ICU  -c/w pantoprazole 40mg daily.      Diet: Regular Diet Adult    DVT Prophylaxis: Enoxaparin (Lovenox) SQ  Correa Catheter: Not present  Fluids: None  Central Lines: None  Code Status: Full Code      Disposition Plan   Expected  Discharge: 12/13/2021      The patient's care was discussed with the Attending Physician, Dr. Franz.    Nataly Matos MD  [address]  Securely message with the Vocera Web Console (learn more here)  Text page via [facility] Paging/Directory    Please see sign in/sign out for up to date coverage information  ______________________________________________________________________    Interval History   -No acute events overnight.   -Pt notes his back bothers him when he tries to lay on his back, so he has preferred to lay on his stomach, which he also feels helps his bleeding.  -Has been able to communicate with his family. Does not feel staff need to update his family.   -Denies any other pain or discomfort.     Data reviewed today: I reviewed all medications, new labs and imaging results over the last 24 hours.    Physical Exam   Vital Signs: Temp: 99.9  F (37.7  C) Temp src: Oral BP: 98/55 Pulse: 54   Resp: 18 SpO2: 94 % O2 Device: High Flow Nasal Cannula (HFNC) Oxygen Delivery: 50 LPM  Weight: 186 lbs 11.67 oz  General Appearance: Awake, prone, engages in conversation, nontoxic  Respiratory: Scattered coarse crackles throughout BL lungs. Breathing comfortably on HFNC- able to speak in complete sentences.   Cardiovascular: RRR, normal S1/S2  GI: Not examined; pt prone  Skin: No rashes on visualized portions of skin.  Neuro: AOx3, spontaneously moving all extremities.  Psych: Pleasant, appropriate speech/language    Data   Recent Labs   Lab 12/10/21  0515 12/10/21  0427 12/09/21  2213 12/09/21  1626 12/09/21  0948 12/09/21  0839 12/09/21  0513 12/08/21  0840 12/08/21  0718 12/06/21  1457 12/06/21  1303   WBC 7.9  --   --   --   --   --  7.5  --  5.8   < > 5.2   HGB 14.8  --   --   --   --   --  14.8  --  15.8   < > 16.8   MCV 91  --   --   --   --   --  92  --  92   < > 90     --   --   --   --   --  250  --  232   < >  --    INR 1.03  --   --    --   --   --   --   --   --   --  0.95     --   --   --  137  --   --   --  136   < >  --    POTASSIUM 4.3  --   --   --  4.5  --   --   --  4.2   < >  --    CHLORIDE 107  --   --   --  105  --   --   --  102   < >  --    CO2 25  --   --   --  26  --   --   --  26   < >  --    BUN 25  --   --   --  23  --   --   --  23   < >  --    CR 0.77  --   --   --  0.79  --   --   --  0.87   < >  --    ANIONGAP 4  --   --   --  6  --   --   --  8   < >  --    AVERY 8.2*  --   --   --  8.7  --   --   --  8.5   < >  --    GLC 88 84 110*   < > 90   < >  --    < > 118*   < >  --    ALBUMIN 2.5*  --   --   --  2.7*  --   --   --  2.7*   < >  --    PROTTOTAL 6.1*  --   --   --  6.8  --   --   --  6.8   < >  --    BILITOTAL 1.2  --   --   --  1.4*  --   --   --  1.2   < >  --    ALKPHOS 52  --   --   --  52  --   --   --  51   < >  --    ALT 90*  --   --   --  91*  --   --   --  75*   < >  --    AST 60*  --   --   --  78*  --   --   --  89*   < >  --     < > = values in this interval not displayed.     No results found for this or any previous visit (from the past 24 hour(s)).

## 2021-12-11 ENCOUNTER — APPOINTMENT (OUTPATIENT)
Dept: PHYSICAL THERAPY | Facility: CLINIC | Age: 61
DRG: 177 | End: 2021-12-11
Attending: ANESTHESIOLOGY
Payer: COMMERCIAL

## 2021-12-11 LAB
ALBUMIN SERPL-MCNC: 2.5 G/DL (ref 3.4–5)
ALP SERPL-CCNC: 48 U/L (ref 40–150)
ALT SERPL W P-5'-P-CCNC: 89 U/L (ref 0–70)
ANION GAP SERPL CALCULATED.3IONS-SCNC: 5 MMOL/L (ref 3–14)
AST SERPL W P-5'-P-CCNC: 55 U/L (ref 0–45)
BASOPHILS # BLD AUTO: 0 10E3/UL (ref 0–0.2)
BASOPHILS NFR BLD AUTO: 0 %
BILIRUB SERPL-MCNC: 1.3 MG/DL (ref 0.2–1.3)
BUN SERPL-MCNC: 23 MG/DL (ref 7–30)
CALCIUM SERPL-MCNC: 8.4 MG/DL (ref 8.5–10.1)
CHLORIDE BLD-SCNC: 106 MMOL/L (ref 94–109)
CO2 SERPL-SCNC: 24 MMOL/L (ref 20–32)
CREAT SERPL-MCNC: 0.84 MG/DL (ref 0.66–1.25)
CRP SERPL-MCNC: 30 MG/L (ref 0–8)
EOSINOPHIL # BLD AUTO: 0.1 10E3/UL (ref 0–0.7)
EOSINOPHIL NFR BLD AUTO: 1 %
ERYTHROCYTE [DISTWIDTH] IN BLOOD BY AUTOMATED COUNT: 11.9 % (ref 10–15)
GFR SERPL CREATININE-BSD FRML MDRD: >90 ML/MIN/1.73M2
GLUCOSE BLD-MCNC: 90 MG/DL (ref 70–99)
HCT VFR BLD AUTO: 46.3 % (ref 40–53)
HGB BLD-MCNC: 15 G/DL (ref 13.3–17.7)
IMM GRANULOCYTES # BLD: 0.2 10E3/UL
IMM GRANULOCYTES NFR BLD: 2 %
LYMPHOCYTES # BLD AUTO: 0.7 10E3/UL (ref 0.8–5.3)
LYMPHOCYTES NFR BLD AUTO: 6 %
MCH RBC QN AUTO: 31.1 PG (ref 26.5–33)
MCHC RBC AUTO-ENTMCNC: 32.4 G/DL (ref 31.5–36.5)
MCV RBC AUTO: 96 FL (ref 78–100)
MONOCYTES # BLD AUTO: 0.5 10E3/UL (ref 0–1.3)
MONOCYTES NFR BLD AUTO: 4 %
NEUTROPHILS # BLD AUTO: 9.5 10E3/UL (ref 1.6–8.3)
NEUTROPHILS NFR BLD AUTO: 87 %
PLATELET # BLD AUTO: 322 10E3/UL (ref 150–450)
POTASSIUM BLD-SCNC: 4.4 MMOL/L (ref 3.4–5.3)
PROCALCITONIN SERPL-MCNC: 0.07 NG/ML
PROT SERPL-MCNC: 6.1 G/DL (ref 6.8–8.8)
RBC # BLD AUTO: 4.82 10E6/UL (ref 4.4–5.9)
SODIUM SERPL-SCNC: 135 MMOL/L (ref 133–144)
WBC # BLD AUTO: 10.9 10E3/UL (ref 4–11)
WBC # BLD AUTO: ABNORMAL 10*3/UL

## 2021-12-11 PROCEDURE — 94799 UNLISTED PULMONARY SVC/PX: CPT

## 2021-12-11 PROCEDURE — 86140 C-REACTIVE PROTEIN: CPT | Performed by: STUDENT IN AN ORGANIZED HEALTH CARE EDUCATION/TRAINING PROGRAM

## 2021-12-11 PROCEDURE — 80053 COMPREHEN METABOLIC PANEL: CPT

## 2021-12-11 PROCEDURE — 36415 COLL VENOUS BLD VENIPUNCTURE: CPT

## 2021-12-11 PROCEDURE — 120N000003 HC R&B IMCU UMMC

## 2021-12-11 PROCEDURE — 85027 COMPLETE CBC AUTOMATED: CPT | Performed by: STUDENT IN AN ORGANIZED HEALTH CARE EDUCATION/TRAINING PROGRAM

## 2021-12-11 PROCEDURE — 84145 PROCALCITONIN (PCT): CPT | Performed by: STUDENT IN AN ORGANIZED HEALTH CARE EDUCATION/TRAINING PROGRAM

## 2021-12-11 PROCEDURE — 250N000011 HC RX IP 250 OP 636

## 2021-12-11 PROCEDURE — 99232 SBSQ HOSP IP/OBS MODERATE 35: CPT | Mod: GC | Performed by: PEDIATRICS

## 2021-12-11 PROCEDURE — 250N000012 HC RX MED GY IP 250 OP 636 PS 637: Performed by: STUDENT IN AN ORGANIZED HEALTH CARE EDUCATION/TRAINING PROGRAM

## 2021-12-11 PROCEDURE — 250N000013 HC RX MED GY IP 250 OP 250 PS 637: Performed by: STUDENT IN AN ORGANIZED HEALTH CARE EDUCATION/TRAINING PROGRAM

## 2021-12-11 PROCEDURE — 97110 THERAPEUTIC EXERCISES: CPT | Mod: GP

## 2021-12-11 RX ADMIN — PANTOPRAZOLE SODIUM 40 MG: 40 TABLET, DELAYED RELEASE ORAL at 09:28

## 2021-12-11 RX ADMIN — DEXAMETHASONE 6 MG: 2 TABLET ORAL at 09:27

## 2021-12-11 RX ADMIN — ENOXAPARIN SODIUM 40 MG: 40 INJECTION SUBCUTANEOUS at 09:26

## 2021-12-11 RX ADMIN — BARICITINIB 4 MG: 2 TABLET, FILM COATED ORAL at 09:26

## 2021-12-11 ASSESSMENT — ACTIVITIES OF DAILY LIVING (ADL)
ADLS_ACUITY_SCORE: 4
ADLS_ACUITY_SCORE: 6
ADLS_ACUITY_SCORE: 4
ADLS_ACUITY_SCORE: 6
ADLS_ACUITY_SCORE: 4

## 2021-12-11 ASSESSMENT — MIFFLIN-ST. JEOR: SCORE: 1636.38

## 2021-12-11 NOTE — PLAN OF CARE
Neuro: A&Ox4.   Cardiac: SB-SR. Rates 36-70s with activity. Order to notify if HR is less than 30. VSS.    Respiratory: Sating >90% on 80% 50L HFNC. Up to 100% with activity.  GI/: Adequate urine output. BM x1.   Diet/appetite: Tolerating regular diet. Eating well.  Activity:  SBA in room  Pain: Denies   Skin: No new deficits noted.  LDA's: PIV SL    Plan: Continue with POC. Notify primary team with changes.

## 2021-12-11 NOTE — PROGRESS NOTES
[facility]    Progress Note - [facility] 2 Service      Date of Admission:  12/8/2021    Changes today:  - continue current plan of care    Assessment & Plan   Juan June is a 61 year old male with PMH dyslipidemia, EMILY, GERD who was admitted on 12/8/2021 acute hypoxemic respiratory failure secondary to COVID19 PNA. His oxygen needs remain stable on HFNC and he remains hemodynamically stable.    # Increased WBC   WBC uptrended from 7.9 to 10.9 on 12/11. Differential added on showed lymphopenia. CRP still downtrending and procal mildly elevated at 0.07. Unlikely to be steroids since he is on day 4. May represent new infection or progression of COVID.   - AM CBC with diff    #Acute Hypoxic Respiratory Failure 2/2 Covid-19 PNA:  Admitted to [place] on 12/6 for acute hypoxia. Per EMR, it appears pt's first symptoms were around 12/1. Positive confirmed test on 12/6, however, EMR also notes in writing first confirmed test around 12/1. He was started on dexamethasone, but declined remdesivir. D. Dimer 0.075 on admission. CRP continues to down-trend.   -Covid-19 therapies:   -c/w dexamethasone    -c/w barencitinib   -Pt declined remdesivir  -DVT ppx:   -Lovenox qday  -Low suspicion for superimposed bacterial infection   -Respiratory culture w/ gram stain pending   -No indication for current antibiotic therapy.  -Oxygen support: continues of HFNC  -Pt continues to self-prone as tolerated    #Elevated liver enzymes  Likely due to COVID infection. ALT and AST elevated on admission, down trending. Alkaline phosphatase is normal. Pt does not endorse any abdominal pain.   -Consider RUQ ultrasound if worsening enzymes or abdominal pain.     #GERD:  Takes omeprazole at home. Placed on protonix in ICU  -c/w pantoprazole 40mg daily.      Diet: Regular Diet Adult    DVT Prophylaxis: Enoxaparin (Lovenox) SQ  Correa Catheter: Not present  Fluids: None  Central Lines: None  Code Status:  Full Code      Disposition Plan   Expected Discharge: 5+ days to prior living pending weaning off oxygen.     The patient's care was discussed with the Attending Physician, Dr. Franz.    Antonio Morrow MD  PGY-2 Internal Medicine  [facility] Service  ______________________________________________________________________    Interval History   NAEO. Doing well today, breathing about the same he reports. No worse or better. 4 point ROS reviewed and negative.     Data reviewed today: I reviewed all medications, new labs and imaging results over the last 24 hours.    Physical Exam   Vital Signs: Temp: 99.2  F (37.3  C) Temp src: Oral BP: 112/69 Pulse: 63   Resp: 20 SpO2: 94 % O2 Device: High Flow Nasal Cannula (HFNC) Oxygen Delivery: 50 LPM  Weight: 185 lbs 6.51 oz  General Appearance: Sitting up in bed in NAD  Respiratory: Mild basilar crackles, otherwise CTAB. Breathing comfortably on HFNC- able to speak in complete sentences.   Cardiovascular: regular rate 2+ radial pulses  GI: soft NT ND  Skin: No rashes on visualized portions of skin.  Neuro: AOx3, spontaneously moving all extremities.  Psych: Pleasant, appropriate speech/language    Data   Recent Labs   Lab 12/11/21  0605 12/10/21  1609 12/10/21  1432 12/10/21  0515 12/09/21  1626 12/09/21  0948 12/09/21  0839 12/09/21  0513 12/06/21  1457 12/06/21  1303   WBC 10.9  --   --  7.9  --   --   --  7.5   < > 5.2   HGB 15.0  --   --  14.8  --   --   --  14.8   < > 16.8   MCV 96  --   --  91  --   --   --  92   < > 90     --   --  254  --   --   --  250   < >  --    INR  --   --   --  1.03  --   --   --   --   --  0.95     --   --  136  --  137  --   --    < >  --    POTASSIUM 4.4  --   --  4.3  --  4.5  --   --    < >  --    CHLORIDE 106  --   --  107  --  105  --   --    < >  --    CO2 24  --   --  25  --  26  --   --    < >  --    BUN 23  --   --  25  --  23  --   --    < >  --    CR 0.84  --   --  0.77  --  0.79  --   --    < >  --    ANIONGAP 5  --    --  4  --  6  --   --    < >  --    AVERY 8.4*  --   --  8.2*  --  8.7  --   --    < >  --    GLC 90 134* 131* 88   < > 90   < >  --    < >  --    ALBUMIN 2.5*  --   --  2.5*  --  2.7*  --   --    < >  --    PROTTOTAL 6.1*  --   --  6.1*  --  6.8  --   --    < >  --    BILITOTAL 1.3  --   --  1.2  --  1.4*  --   --    < >  --    ALKPHOS 48  --   --  52  --  52  --   --    < >  --    ALT 89*  --   --  90*  --  91*  --   --    < >  --    AST 55*  --   --  60*  --  78*  --   --    < >  --     < > = values in this interval not displayed.     No results found for this or any previous visit (from the past 24 hour(s)).

## 2021-12-12 ENCOUNTER — APPOINTMENT (OUTPATIENT)
Dept: GENERAL RADIOLOGY | Facility: CLINIC | Age: 61
DRG: 177 | End: 2021-12-12
Attending: ANESTHESIOLOGY
Payer: COMMERCIAL

## 2021-12-12 LAB
ALBUMIN SERPL-MCNC: 2.5 G/DL (ref 3.4–5)
ALBUMIN UR-MCNC: 20 MG/DL
ALP SERPL-CCNC: 53 U/L (ref 40–150)
ALT SERPL W P-5'-P-CCNC: 86 U/L (ref 0–70)
ANION GAP SERPL CALCULATED.3IONS-SCNC: 7 MMOL/L (ref 3–14)
APPEARANCE UR: CLEAR
AST SERPL W P-5'-P-CCNC: 57 U/L (ref 0–45)
BACTERIA SPT CULT: NORMAL
BASOPHILS # BLD AUTO: 0 10E3/UL (ref 0–0.2)
BASOPHILS NFR BLD AUTO: 0 %
BILIRUB SERPL-MCNC: 1 MG/DL (ref 0.2–1.3)
BILIRUB UR QL STRIP: NEGATIVE
BUN SERPL-MCNC: 19 MG/DL (ref 7–30)
CALCIUM SERPL-MCNC: 8.5 MG/DL (ref 8.5–10.1)
CHLORIDE BLD-SCNC: 104 MMOL/L (ref 94–109)
CO2 SERPL-SCNC: 24 MMOL/L (ref 20–32)
COLOR UR AUTO: YELLOW
CREAT SERPL-MCNC: 0.78 MG/DL (ref 0.66–1.25)
CRP SERPL-MCNC: 46 MG/L (ref 0–8)
EOSINOPHIL # BLD AUTO: 0.2 10E3/UL (ref 0–0.7)
EOSINOPHIL NFR BLD AUTO: 1 %
ERYTHROCYTE [DISTWIDTH] IN BLOOD BY AUTOMATED COUNT: 11.9 % (ref 10–15)
GFR SERPL CREATININE-BSD FRML MDRD: >90 ML/MIN/1.73M2
GLUCOSE BLD-MCNC: 87 MG/DL (ref 70–99)
GLUCOSE UR STRIP-MCNC: NEGATIVE MG/DL
GRAM STAIN RESULT: NORMAL
HCT VFR BLD AUTO: 42.9 % (ref 40–53)
HGB BLD-MCNC: 14.7 G/DL (ref 13.3–17.7)
HGB UR QL STRIP: NEGATIVE
IMM GRANULOCYTES # BLD: 0.4 10E3/UL
IMM GRANULOCYTES NFR BLD: 3 %
KETONES UR STRIP-MCNC: NEGATIVE MG/DL
LEUKOCYTE ESTERASE UR QL STRIP: NEGATIVE
LYMPHOCYTES # BLD AUTO: 0.8 10E3/UL (ref 0.8–5.3)
LYMPHOCYTES NFR BLD AUTO: 7 %
MCH RBC QN AUTO: 31.5 PG (ref 26.5–33)
MCHC RBC AUTO-ENTMCNC: 34.3 G/DL (ref 31.5–36.5)
MCV RBC AUTO: 92 FL (ref 78–100)
MONOCYTES # BLD AUTO: 0.4 10E3/UL (ref 0–1.3)
MONOCYTES NFR BLD AUTO: 3 %
MRSA DNA SPEC QL NAA+PROBE: NEGATIVE
MUCOUS THREADS #/AREA URNS LPF: PRESENT /LPF
NEUTROPHILS # BLD AUTO: 9.7 10E3/UL (ref 1.6–8.3)
NEUTROPHILS NFR BLD AUTO: 86 %
NITRATE UR QL: NEGATIVE
NRBC # BLD AUTO: 0 10E3/UL
NRBC BLD AUTO-RTO: 0 /100
PH UR STRIP: 6 [PH] (ref 5–7)
PLATELET # BLD AUTO: 345 10E3/UL (ref 150–450)
POTASSIUM BLD-SCNC: 4.4 MMOL/L (ref 3.4–5.3)
PROT SERPL-MCNC: 6.2 G/DL (ref 6.8–8.8)
RBC # BLD AUTO: 4.66 10E6/UL (ref 4.4–5.9)
RBC URINE: 0 /HPF
SA TARGET DNA: POSITIVE
SODIUM SERPL-SCNC: 135 MMOL/L (ref 133–144)
SP GR UR STRIP: 1.03 (ref 1–1.03)
UROBILINOGEN UR STRIP-MCNC: NORMAL MG/DL
WBC # BLD AUTO: 11.4 10E3/UL (ref 4–11)
WBC URINE: 0 /HPF

## 2021-12-12 PROCEDURE — 258N000003 HC RX IP 258 OP 636: Performed by: PEDIATRICS

## 2021-12-12 PROCEDURE — 86140 C-REACTIVE PROTEIN: CPT

## 2021-12-12 PROCEDURE — 36415 COLL VENOUS BLD VENIPUNCTURE: CPT | Performed by: STUDENT IN AN ORGANIZED HEALTH CARE EDUCATION/TRAINING PROGRAM

## 2021-12-12 PROCEDURE — 87040 BLOOD CULTURE FOR BACTERIA: CPT | Performed by: STUDENT IN AN ORGANIZED HEALTH CARE EDUCATION/TRAINING PROGRAM

## 2021-12-12 PROCEDURE — 87899 AGENT NOS ASSAY W/OPTIC: CPT | Performed by: STUDENT IN AN ORGANIZED HEALTH CARE EDUCATION/TRAINING PROGRAM

## 2021-12-12 PROCEDURE — 80053 COMPREHEN METABOLIC PANEL: CPT | Performed by: STUDENT IN AN ORGANIZED HEALTH CARE EDUCATION/TRAINING PROGRAM

## 2021-12-12 PROCEDURE — 99233 SBSQ HOSP IP/OBS HIGH 50: CPT | Mod: GC | Performed by: PEDIATRICS

## 2021-12-12 PROCEDURE — 250N000011 HC RX IP 250 OP 636: Performed by: STUDENT IN AN ORGANIZED HEALTH CARE EDUCATION/TRAINING PROGRAM

## 2021-12-12 PROCEDURE — 85025 COMPLETE CBC W/AUTO DIFF WBC: CPT | Performed by: STUDENT IN AN ORGANIZED HEALTH CARE EDUCATION/TRAINING PROGRAM

## 2021-12-12 PROCEDURE — 999N000157 HC STATISTIC RCP TIME EA 10 MIN

## 2021-12-12 PROCEDURE — 81001 URINALYSIS AUTO W/SCOPE: CPT | Performed by: STUDENT IN AN ORGANIZED HEALTH CARE EDUCATION/TRAINING PROGRAM

## 2021-12-12 PROCEDURE — 120N000003 HC R&B IMCU UMMC

## 2021-12-12 PROCEDURE — 250N000012 HC RX MED GY IP 250 OP 636 PS 637: Performed by: STUDENT IN AN ORGANIZED HEALTH CARE EDUCATION/TRAINING PROGRAM

## 2021-12-12 PROCEDURE — 71045 X-RAY EXAM CHEST 1 VIEW: CPT

## 2021-12-12 PROCEDURE — 87070 CULTURE OTHR SPECIMN AEROBIC: CPT | Performed by: STUDENT IN AN ORGANIZED HEALTH CARE EDUCATION/TRAINING PROGRAM

## 2021-12-12 PROCEDURE — 71045 X-RAY EXAM CHEST 1 VIEW: CPT | Mod: 26 | Performed by: STUDENT IN AN ORGANIZED HEALTH CARE EDUCATION/TRAINING PROGRAM

## 2021-12-12 PROCEDURE — 250N000013 HC RX MED GY IP 250 OP 250 PS 637: Performed by: STUDENT IN AN ORGANIZED HEALTH CARE EDUCATION/TRAINING PROGRAM

## 2021-12-12 PROCEDURE — 250N000011 HC RX IP 250 OP 636

## 2021-12-12 PROCEDURE — 87641 MR-STAPH DNA AMP PROBE: CPT | Performed by: STUDENT IN AN ORGANIZED HEALTH CARE EDUCATION/TRAINING PROGRAM

## 2021-12-12 PROCEDURE — 250N000011 HC RX IP 250 OP 636: Performed by: PEDIATRICS

## 2021-12-12 RX ORDER — VANCOMYCIN HYDROCHLORIDE 1 G/200ML
1000 INJECTION, SOLUTION INTRAVENOUS EVERY 12 HOURS
Status: DISCONTINUED | OUTPATIENT
Start: 2021-12-12 | End: 2021-12-12

## 2021-12-12 RX ADMIN — CEFEPIME HYDROCHLORIDE 2 G: 2 INJECTION, POWDER, FOR SOLUTION INTRAVENOUS at 10:57

## 2021-12-12 RX ADMIN — BARICITINIB 4 MG: 2 TABLET, FILM COATED ORAL at 08:42

## 2021-12-12 RX ADMIN — ENOXAPARIN SODIUM 40 MG: 40 INJECTION SUBCUTANEOUS at 08:45

## 2021-12-12 RX ADMIN — VANCOMYCIN HYDROCHLORIDE 1500 MG: 100 INJECTION, POWDER, LYOPHILIZED, FOR SOLUTION INTRAVENOUS at 11:52

## 2021-12-12 RX ADMIN — DEXAMETHASONE 6 MG: 2 TABLET ORAL at 08:42

## 2021-12-12 RX ADMIN — PANTOPRAZOLE SODIUM 40 MG: 40 TABLET, DELAYED RELEASE ORAL at 08:42

## 2021-12-12 RX ADMIN — CEFEPIME HYDROCHLORIDE 2 G: 2 INJECTION, POWDER, FOR SOLUTION INTRAVENOUS at 19:04

## 2021-12-12 ASSESSMENT — ACTIVITIES OF DAILY LIVING (ADL)
ADLS_ACUITY_SCORE: 4

## 2021-12-12 ASSESSMENT — MIFFLIN-ST. JEOR: SCORE: 1638.38

## 2021-12-12 NOTE — PLAN OF CARE
Neuro: A&Ox4. Afebrile.  Cardiac: SB-SR. Rates 38-70s. VSS.   Respiratory: Sating >90% on 80% 50L HFNC.   GI/: Adequate urine output. No BM overnight.  Diet/appetite: Tolerating regular diet. Eating well.  Activity:  SBA to ambulate in room  Pain: At acceptable level on current regimen.   Skin: No new deficits noted.  LDA's: PIV SL    Plan: Continue with POC. Notify primary team with changes.

## 2021-12-12 NOTE — PLAN OF CARE
Neuro: A&Ox4. Calls as needed.  Cardiac: SB 45-65. VSS.   Respiratory: Sating 90%> on 70 % FIO2 HFNC at 50 LPM  GI/: Adequate urine output. BM X1  Diet/appetite: Tolerating regular diet. Eating well.  Activity:  SBA, up to chair in room.  Pain: At acceptable level on current regimen.   Skin: No new deficits noted.  LDA's:One PIV  Plan: Continue with POC. Notify primary team with changes.

## 2021-12-12 NOTE — PROGRESS NOTES
[facility]    Progress Note - Maroon 2 Service        Date of Admission:  12/8/2021    Assessment & Plan   Juan June is a 61 year old male with PMH dyslipidemia, EMILY, GERD who was admitted on 12/8/2021 acute hypoxemic respiratory failure secondary to COVID19 PNA. Concern today for HAP given rising WBC + CRP, though his oxygen needs remain stable on HFNC and he remains hemodynamically stable.    Today:  -c/w dexamethasone + barencitinib  -sputum cx, blood cx, UA, MRSA nares to assess rising WBC + CRP  -start cefepime for HAP    #Acute Hypoxic Respiratory Failure 2/2 Covid-19 PNA:  #Concern for superimposed bacterial infection  Admitted to [place] on 12/6 for acute hypoxia. Per EMR, it appears pt's first symptoms were around 12/1. Positive confirmed test on 12/6, however, EMR also notes in writing first confirmed test around 12/1. His oxygen requirements have been relatively stable since admission. D. Dimer 0.075 on admission. Overnight, WBC increased to 11.4 from 10.9 and CRP increased to 46 from 30 which is concerning for superimposed bacterial infection. Will start cefepime for HAP coverage (MRSA negative).  UA Negative.   -Covid-19 therapies:   -c/w dexamethasone (12/8-p)   -c/w barencitinib (12/8-p)   -Pt declined remdesivir  -DVT ppx:   -Lovenox qday  -Concern for superimposed bacterial infection:   -Respiratory culture w/ gram stain pending   -Bcx x2 pending   -Started on cefepime (12/12-p)    -Vanc discontinued d/t negative MRSA nares.   -Oxygen support:   -Remains stable on 90% FiO2 at 50 L  -Pt continues to self-prone as tolerated    #Elevated liver enzymes, stable  ALT and AST elevated on admission- these have remained stable since admission. Alkaline phosphatase is normal. Pt does not endorse any abdominal pain. No hx of elevated liver enzymes. Likely 2/2 covid-19 inflammation.   -CMP tomorrow   -Consider RUQ ultrasound outpatient if liver enzymes  remain elevated after resolution of covid-19.     #GERD:  Takes omeprazole at home. Placed on protonix in ICU  -c/w pantoprazole 40mg daily.      Diet: Regular Diet Adult    DVT Prophylaxis: Enoxaparin (Lovenox) SQ  Correa Catheter: Not present  Fluids: None  Central Lines: None  Code Status: Full Code      Disposition Plan   Expected Discharge: 12/20/2021      The patient's care was discussed with the Attending Physician, Dr. Franz.    Nataly Matos MD  [address]  Securely message with the Vocera Web Console (learn more here)  Text page via [facility] Paging/Directory    Please see sign in/sign out for up to date coverage information  ______________________________________________________________________    Interval History   -No acute events overnight.   -Pt notes no change in how his breathing or cough feel. He is producing sputum.  -Denies abdominal pain.   -Continues to eat and drink w/o issues. Sleeping well overnight.     Data reviewed today: I reviewed all medications, new labs and imaging results over the last 24 hours.    Physical Exam   Vital Signs: Temp: 98.4  F (36.9  C) Temp src: Oral BP: 114/69 Pulse: (!) 43   Resp: 18 SpO2: 93 % O2 Device: High Flow Nasal Cannula (HFNC) Oxygen Delivery: 50 LPM  Weight: 185 lbs 13.56 oz  General Appearance: Awake, prone, engages in conversation, nontoxic  Respiratory: Left upper lung with coarse crackles- other lung fields clear to auscultation. Breathing comfortably on HFNC- able to speak in complete sentences.   Cardiovascular: RRR, normal S1/S2  GI: Not examined; pt prone  Skin: No rashes on visualized portions of skin.  Neuro: AOx3, spontaneously moving all extremities.  Psych: Pleasant, appropriate speech/language    Data   Recent Labs   Lab 12/12/21  0455 12/11/21  0605 12/10/21  1609 12/10/21  1432 12/10/21  0515 12/06/21  1457 12/06/21  1303   WBC 11.4* 10.9  --   --  7.9   < > 5.2   HGB 14.7  15.0  --   --  14.8   < > 16.8   MCV 92 96  --   --  91   < > 90    322  --   --  254   < >  --    INR  --   --   --   --  1.03  --  0.95    135  --   --  136   < >  --    POTASSIUM 4.4 4.4  --   --  4.3   < >  --    CHLORIDE 104 106  --   --  107   < >  --    CO2 24 24  --   --  25   < >  --    BUN 19 23  --   --  25   < >  --    CR 0.78 0.84  --   --  0.77   < >  --    ANIONGAP 7 5  --   --  4   < >  --    AVERY 8.5 8.4*  --   --  8.2*   < >  --    GLC 87 90 134*   < > 88   < >  --    ALBUMIN 2.5* 2.5*  --   --  2.5*   < >  --    PROTTOTAL 6.2* 6.1*  --   --  6.1*   < >  --    BILITOTAL 1.0 1.3  --   --  1.2   < >  --    ALKPHOS 53 48  --   --  52   < >  --    ALT 86* 89*  --   --  90*   < >  --    AST 57* 55*  --   --  60*   < >  --     < > = values in this interval not displayed.     No results found for this or any previous visit (from the past 24 hour(s)).

## 2021-12-12 NOTE — PHARMACY-VANCOMYCIN DOSING SERVICE
Pharmacy Vancomycin Initial Note  Date of Service 2021  Patient's  1960  61 year old, male    Indication: [facility] Pneumonia    Current estimated CrCl = Estimated Creatinine Clearance: 118.6 mL/min (based on SCr of 0.78 mg/dL).    Creatinine for last 3 days  12/10/2021:  5:15 AM Creatinine 0.77 mg/dL  2021:  6:05 AM Creatinine 0.84 mg/dL  2021:  4:55 AM Creatinine 0.78 mg/dL    Recent Vancomycin Level(s) for last 3 days  No results found for requested labs within last 72 hours.      Vancomycin IV Administrations (past 72 hours)      No vancomycin orders with administrations in past 72 hours.                Nephrotoxins and other renal medications (From now, onward)    None          Contrast Orders - past 72 hours (72h ago, onward)            None          [employer] Prediction of Planned Initial Vancomycin Regimen  Loading dose: 1500 mg at 00:00 2021.  Regimen: 1000 mg IV every 12 hours.  Start time: 10:32 on 2021  Exposure target: AUC24 (range)400-600 mg/L.hr   AUC24,ss: 444 mg/L.hr  Probability of AUC24 > 400: 61 %  Ctrough,ss: 13.6 mg/L  Probability of Ctrough,ss > 20: 21 %  Probability of nephrotoxicity (Lodise BALJEET ): 9 %          Plan:  1. Start vancomycin  1500 mg IV once followed by 1000mg IV every 12h.   2. Vancomycin monitoring method: AUC  3. Vancomycin therapeutic monitoring goal: 400-600 mg*h/L  4. Pharmacy will check vancomycin levels as appropriate in 1-3 Days.    5. Serum creatinine levels will be ordered daily for the first week of therapy and at least twice weekly for subsequent weeks.      Alex Leon RPH

## 2021-12-13 ENCOUNTER — APPOINTMENT (OUTPATIENT)
Dept: PHYSICAL THERAPY | Facility: CLINIC | Age: 61
DRG: 177 | End: 2021-12-13
Attending: ANESTHESIOLOGY
Payer: COMMERCIAL

## 2021-12-13 LAB
ALBUMIN SERPL-MCNC: 2.3 G/DL (ref 3.4–5)
ALP SERPL-CCNC: 56 U/L (ref 40–150)
ALT SERPL W P-5'-P-CCNC: 83 U/L (ref 0–70)
ANION GAP SERPL CALCULATED.3IONS-SCNC: 6 MMOL/L (ref 3–14)
AST SERPL W P-5'-P-CCNC: 47 U/L (ref 0–45)
BILIRUB SERPL-MCNC: 0.8 MG/DL (ref 0.2–1.3)
BUN SERPL-MCNC: 19 MG/DL (ref 7–30)
CALCIUM SERPL-MCNC: 8.2 MG/DL (ref 8.5–10.1)
CHLORIDE BLD-SCNC: 106 MMOL/L (ref 94–109)
CO2 SERPL-SCNC: 24 MMOL/L (ref 20–32)
CREAT SERPL-MCNC: 0.77 MG/DL (ref 0.66–1.25)
ERYTHROCYTE [DISTWIDTH] IN BLOOD BY AUTOMATED COUNT: 11.9 % (ref 10–15)
GFR SERPL CREATININE-BSD FRML MDRD: >90 ML/MIN/1.73M2
GLUCOSE BLD-MCNC: 94 MG/DL (ref 70–99)
HCT VFR BLD AUTO: 40.8 % (ref 40–53)
HGB BLD-MCNC: 14 G/DL (ref 13.3–17.7)
L PNEUMO1 AG UR QL IA: NEGATIVE
MCH RBC QN AUTO: 31.5 PG (ref 26.5–33)
MCHC RBC AUTO-ENTMCNC: 34.3 G/DL (ref 31.5–36.5)
MCV RBC AUTO: 92 FL (ref 78–100)
PLATELET # BLD AUTO: 358 10E3/UL (ref 150–450)
POTASSIUM BLD-SCNC: 4.2 MMOL/L (ref 3.4–5.3)
PROCALCITONIN SERPL-MCNC: <0.05 NG/ML
PROT SERPL-MCNC: 6 G/DL (ref 6.8–8.8)
RBC # BLD AUTO: 4.44 10E6/UL (ref 4.4–5.9)
S PNEUM AG SPEC QL: NEGATIVE
SODIUM SERPL-SCNC: 136 MMOL/L (ref 133–144)
WBC # BLD AUTO: 12 10E3/UL (ref 4–11)

## 2021-12-13 PROCEDURE — 250N000013 HC RX MED GY IP 250 OP 250 PS 637: Performed by: STUDENT IN AN ORGANIZED HEALTH CARE EDUCATION/TRAINING PROGRAM

## 2021-12-13 PROCEDURE — 97110 THERAPEUTIC EXERCISES: CPT | Mod: GP

## 2021-12-13 PROCEDURE — 99233 SBSQ HOSP IP/OBS HIGH 50: CPT | Mod: GC | Performed by: PEDIATRICS

## 2021-12-13 PROCEDURE — 999N000111 HC STATISTIC OT IP EVAL DEFER

## 2021-12-13 PROCEDURE — 250N000011 HC RX IP 250 OP 636: Performed by: STUDENT IN AN ORGANIZED HEALTH CARE EDUCATION/TRAINING PROGRAM

## 2021-12-13 PROCEDURE — 36415 COLL VENOUS BLD VENIPUNCTURE: CPT | Performed by: STUDENT IN AN ORGANIZED HEALTH CARE EDUCATION/TRAINING PROGRAM

## 2021-12-13 PROCEDURE — 120N000003 HC R&B IMCU UMMC

## 2021-12-13 PROCEDURE — 84145 PROCALCITONIN (PCT): CPT | Performed by: STUDENT IN AN ORGANIZED HEALTH CARE EDUCATION/TRAINING PROGRAM

## 2021-12-13 PROCEDURE — 250N000011 HC RX IP 250 OP 636

## 2021-12-13 PROCEDURE — 999N000043 HC STATISTIC CTO2 CONT OXYGEN TECH TIME EA 90 MIN

## 2021-12-13 PROCEDURE — 250N000012 HC RX MED GY IP 250 OP 636 PS 637: Performed by: PEDIATRICS

## 2021-12-13 PROCEDURE — 999N000215 HC STATISTIC HFNC ADULT NON-CPAP

## 2021-12-13 PROCEDURE — 80053 COMPREHEN METABOLIC PANEL: CPT

## 2021-12-13 PROCEDURE — 94660 CPAP INITIATION&MGMT: CPT

## 2021-12-13 PROCEDURE — 999N000157 HC STATISTIC RCP TIME EA 10 MIN

## 2021-12-13 PROCEDURE — 85027 COMPLETE CBC AUTOMATED: CPT

## 2021-12-13 RX ADMIN — CEFEPIME HYDROCHLORIDE 2 G: 2 INJECTION, POWDER, FOR SOLUTION INTRAVENOUS at 18:47

## 2021-12-13 RX ADMIN — ENOXAPARIN SODIUM 40 MG: 40 INJECTION SUBCUTANEOUS at 09:54

## 2021-12-13 RX ADMIN — DEXAMETHASONE 6 MG: 2 TABLET ORAL at 09:54

## 2021-12-13 RX ADMIN — PANTOPRAZOLE SODIUM 40 MG: 40 TABLET, DELAYED RELEASE ORAL at 09:54

## 2021-12-13 RX ADMIN — BARICITINIB 4 MG: 2 TABLET, FILM COATED ORAL at 12:58

## 2021-12-13 RX ADMIN — CEFEPIME HYDROCHLORIDE 2 G: 2 INJECTION, POWDER, FOR SOLUTION INTRAVENOUS at 03:15

## 2021-12-13 RX ADMIN — CEFEPIME HYDROCHLORIDE 2 G: 2 INJECTION, POWDER, FOR SOLUTION INTRAVENOUS at 09:54

## 2021-12-13 ASSESSMENT — ACTIVITIES OF DAILY LIVING (ADL)
ADLS_ACUITY_SCORE: 4
ADLS_ACUITY_SCORE: 6
ADLS_ACUITY_SCORE: 4
ADLS_ACUITY_SCORE: 4
ADLS_ACUITY_SCORE: 6
ADLS_ACUITY_SCORE: 4
ADLS_ACUITY_SCORE: 6
ADLS_ACUITY_SCORE: 4
ADLS_ACUITY_SCORE: 6
ADLS_ACUITY_SCORE: 4
ADLS_ACUITY_SCORE: 6
ADLS_ACUITY_SCORE: 4
ADLS_ACUITY_SCORE: 6

## 2021-12-13 NOTE — PLAN OF CARE
Neuro: A&Ox4. Withdrawn at times. Calm and cooperative.  Cardiac: SB-SR. Rates 45-70s. Blood pressures stable.  Respiratory: Sating >90%% on 80% FiO2 50L HFNC. Desats with exertion and requires titrations up to 100% at times.  GI/: Adequate urine output.   Diet/appetite: Tolerating general diet. No BM overnight.  Activity: Independent in room. Steady gait.  Pain: Denies  Skin: No new deficits noted.  LDA's: PIV    Plan: Continue with POC. Notify primary team with changes.

## 2021-12-13 NOTE — PLAN OF CARE
Neuro: A&Ox4.   Cardiac: SR. VSS.   Respiratory: Sating 95% on HFNC 50L 80%  GI/: Adequate urine output. BM X1  Diet/appetite: Tolerating Regular diet. Eating well.  Activity: Standby assist up to chair.  Pain: At acceptable level on current regimen.   Skin: No new deficits noted.  LDA's:    Plan: Continue with POC. Notify primary team with changes.

## 2021-12-13 NOTE — PROGRESS NOTES
[facility]    Progress Note - Maroon 2 Service        Date of Admission:  12/8/2021    Assessment & Plan   Juan June is a 61 year old male with PMH dyslipidemia, EMILY, GERD who was admitted on 12/8/2021 acute hypoxemic respiratory failure secondary to COVID19 PNA. Started HAP treatement on 12/12 given new leukocytosis with persistent HFNC needs and inability to wean. At this time, his HFNC needs remain stable and is is otherwise hemodynamically stable.     Today:  -c/w dexamethasone + barencitinib  -c/w cefepime for HAP coverage  -Pt continues on HFNC with no significant change in o2 requirements.     #Acute Hypoxic Respiratory Failure 2/2 Covid-19 PNA:  #Concern for superimposed HAP:  Admitted to [place] on 12/6 for acute hypoxia. Per EMR, it appears pt's first symptoms were around 12/1 with a positive test around 12/1 (pt unsure of date). He has continued to require HFNC over the past days with stable oxygen requirements, but inability to wean them down. D. Dimer 0.075 on admission. On 12/12, pt developed leukocytosis, with increased CRP. While it is possible this is progression of his Covid-19 it would be quite late in the covid-19 course with a positive test around 12/1. Given this, concern for superimposed bacterial infection. CXR 12/12 notable for peripheral basilar predominant opacities, likely infective. Sputum culture w/ oral contaminants, UA negative.   -Covid-19 therapies:              -c/w dexamethasone (12/8-p)              -c/w barencitinib (12/8-p)              -Pt declined remdesivir  -DVT ppx:              -Lovenox qday  -Concern for HAP:              -Bcx x2 NGTD              -Started on cefepime (12/12-p)    -Given persistent HFNC + leukocytosis- will complete 7 day HAP course                          -Vanc discontinued d/t negative MRSA nares.    -CBC daily, CRP every other day.   -Oxygen support:              -Remains stable on 80% FiO2 at  50 L  -Pt continues to self-prone as tolerated    #Elevated liver enzymes, stable  ALT and AST elevated on admission- these have remained stable since admission. Alkaline phosphatase is normal. Pt does not endorse any abdominal pain. No hx of elevated liver enzymes. Likely 2/2 covid-19 inflammation.   -Continue to trend liver enzymes   -Will space CMP to every other day   -Consider RUQ ultrasound if worsening enzymes or abdominal pain.     #GERD:  Takes omeprazole at home. Placed on protonix in ICU  -c/w pantoprazole 40mg daily.      Diet: Regular Diet Adult    DVT Prophylaxis: Enoxaparin (Lovenox) SQ  Correa Catheter: Not present  Fluids: None  Central Lines: None  Code Status: Full Code      Disposition Plan   Expected Discharge: 12/17/2021      The patient's care was discussed with the Attending Physician, Dr. Franz.    Nataly Matos MD  [address]  Securely message with the Vocera Web Console (learn more here)  Text page via [employer] Paging/Directory    Please see sign in/sign out for up to date coverage information  ______________________________________________________________________    Interval History   -No acute events overnight.   -Pt notes no changes in his dyspnea or cough today. Denies chest pain, abdominal pain.  -Mentions he is trying to sit up in the chair as much as tolerated.   -Continues eating and drinking well. No issues with sleep at this time.     Data reviewed today: I reviewed all medications, new labs and imaging results over the last 24 hours.    Physical Exam   Vital Signs: Temp: 97.6  F (36.4  C) Temp src: Oral BP: 111/64 Pulse: 64   Resp: 22 SpO2: 95 % O2 Device: High Flow Nasal Cannula (HFNC) Oxygen Delivery: 50 LPM  Weight: 185 lbs 13.56 oz  General Appearance: Awake, sitting upright in chair, engages in conversation, nontoxic  Respiratory: BLCTA, Breathing comfortably on HFNC- able to speak in complete sentences, no  noted cough.   Cardiovascular: RRR, no m/r/g, normal S1/S2  GI: Soft, nontender, nondistended. No guarding, rigidity, rebound tenderness.   Skin: No rashes on visualized portions of skin.  Extremities: Warm, no noted BL LE edema.   Neuro: AOx3, spontaneously moving all extremities.  Psych: Pleasant, appropriate speech/language    Data   Recent Labs   Lab 12/13/21  0456 12/12/21  0455 12/11/21  0605 12/10/21  1432 12/10/21  0515 12/06/21  1457 12/06/21  1303   WBC 12.0* 11.4* 10.9  --  7.9   < > 5.2   HGB 14.0 14.7 15.0  --  14.8   < > 16.8   MCV 92 92 96  --  91   < > 90    345 322  --  254   < >  --    INR  --   --   --   --  1.03  --  0.95    135 135  --  136   < >  --    POTASSIUM 4.2 4.4 4.4  --  4.3   < >  --    CHLORIDE 106 104 106  --  107   < >  --    CO2 24 24 24  --  25   < >  --    BUN 19 19 23  --  25   < >  --    CR 0.77 0.78 0.84  --  0.77   < >  --    ANIONGAP 6 7 5  --  4   < >  --    AVERY 8.2* 8.5 8.4*  --  8.2*   < >  --    GLC 94 87 90   < > 88   < >  --    ALBUMIN 2.3* 2.5* 2.5*  --  2.5*   < >  --    PROTTOTAL 6.0* 6.2* 6.1*  --  6.1*   < >  --    BILITOTAL 0.8 1.0 1.3  --  1.2   < >  --    ALKPHOS 56 53 48  --  52   < >  --    ALT 83* 86* 89*  --  90*   < >  --    AST 47* 57* 55*  --  60*   < >  --     < > = values in this interval not displayed.     Recent Results (from the past 24 hour(s))   XR Chest Port 1 View    Narrative    EXAM: XR CHEST PORT 1 VIEW  12/12/2021 11:38 AM     HISTORY:  Leukocytosis, concern for superimposed bacterial infection        COMPARISON:  12/6/2021    FINDINGS: Frontal chest x-ray. The trachea is midline. The  cardiomediastinal silhouette is within normal limits. Peripheral  basilar predominant airspace opacities. No pleural effusion or  pneumothorax. No osseous abnormalities.      Impression    IMPRESSION: Peripheral basilar predominant pulmonary opacities likely  infective.    I have personally reviewed the examination and initial interpretation  and  I agree with the findings.    BRENDA HAWKINS MD         SYSTEM ID:  [id number]

## 2021-12-13 NOTE — PLAN OF CARE
Occupational Therapy: Orders received. Chart reviewed and discussed with care team.? Occupational Therapy not indicated, per chart review and discussion with physical therapy, pt is up independently in room, no acute functional changes with ADLs or cognition changes. Pt is mainly limited by mild deconditioning and increased O2 needs. PT to follow while inpatient for strengthening and activity tolerance.? Defer discharge recommendations to physical therapy.? Will complete orders.

## 2021-12-14 LAB
CREAT SERPL-MCNC: 0.76 MG/DL (ref 0.66–1.25)
CRP SERPL-MCNC: 62 MG/L (ref 0–8)
ERYTHROCYTE [DISTWIDTH] IN BLOOD BY AUTOMATED COUNT: 11.9 % (ref 10–15)
GFR SERPL CREATININE-BSD FRML MDRD: >90 ML/MIN/1.73M2
HCT VFR BLD AUTO: 39.3 % (ref 40–53)
HGB BLD-MCNC: 13.5 G/DL (ref 13.3–17.7)
MCH RBC QN AUTO: 31.4 PG (ref 26.5–33)
MCHC RBC AUTO-ENTMCNC: 34.4 G/DL (ref 31.5–36.5)
MCV RBC AUTO: 91 FL (ref 78–100)
PLATELET # BLD AUTO: 322 10E3/UL (ref 150–450)
RBC # BLD AUTO: 4.3 10E6/UL (ref 4.4–5.9)
WBC # BLD AUTO: 9 10E3/UL (ref 4–11)

## 2021-12-14 PROCEDURE — 250N000011 HC RX IP 250 OP 636

## 2021-12-14 PROCEDURE — 120N000003 HC R&B IMCU UMMC

## 2021-12-14 PROCEDURE — 250N000012 HC RX MED GY IP 250 OP 636 PS 637: Performed by: PEDIATRICS

## 2021-12-14 PROCEDURE — 85027 COMPLETE CBC AUTOMATED: CPT

## 2021-12-14 PROCEDURE — 86140 C-REACTIVE PROTEIN: CPT

## 2021-12-14 PROCEDURE — 250N000011 HC RX IP 250 OP 636: Performed by: STUDENT IN AN ORGANIZED HEALTH CARE EDUCATION/TRAINING PROGRAM

## 2021-12-14 PROCEDURE — 36415 COLL VENOUS BLD VENIPUNCTURE: CPT

## 2021-12-14 PROCEDURE — 82565 ASSAY OF CREATININE: CPT | Performed by: STUDENT IN AN ORGANIZED HEALTH CARE EDUCATION/TRAINING PROGRAM

## 2021-12-14 PROCEDURE — 250N000013 HC RX MED GY IP 250 OP 250 PS 637

## 2021-12-14 PROCEDURE — 99233 SBSQ HOSP IP/OBS HIGH 50: CPT | Mod: GC | Performed by: INTERNAL MEDICINE

## 2021-12-14 PROCEDURE — 250N000013 HC RX MED GY IP 250 OP 250 PS 637: Performed by: STUDENT IN AN ORGANIZED HEALTH CARE EDUCATION/TRAINING PROGRAM

## 2021-12-14 PROCEDURE — 999N000157 HC STATISTIC RCP TIME EA 10 MIN

## 2021-12-14 RX ORDER — CEFDINIR 300 MG/1
300 CAPSULE ORAL EVERY 12 HOURS SCHEDULED
Status: COMPLETED | OUTPATIENT
Start: 2021-12-14 | End: 2021-12-16

## 2021-12-14 RX ORDER — AZITHROMYCIN 250 MG/1
500 TABLET, FILM COATED ORAL DAILY
Status: COMPLETED | OUTPATIENT
Start: 2021-12-14 | End: 2021-12-16

## 2021-12-14 RX ADMIN — BARICITINIB 4 MG: 2 TABLET, FILM COATED ORAL at 09:03

## 2021-12-14 RX ADMIN — PANTOPRAZOLE SODIUM 40 MG: 40 TABLET, DELAYED RELEASE ORAL at 09:03

## 2021-12-14 RX ADMIN — AZITHROMYCIN DIHYDRATE 500 MG: 250 TABLET, FILM COATED ORAL at 14:25

## 2021-12-14 RX ADMIN — CEFEPIME HYDROCHLORIDE 2 G: 2 INJECTION, POWDER, FOR SOLUTION INTRAVENOUS at 02:30

## 2021-12-14 RX ADMIN — ENOXAPARIN SODIUM 40 MG: 40 INJECTION SUBCUTANEOUS at 09:05

## 2021-12-14 RX ADMIN — DEXAMETHASONE 6 MG: 2 TABLET ORAL at 09:04

## 2021-12-14 RX ADMIN — CEFDINIR 300 MG: 300 CAPSULE ORAL at 20:19

## 2021-12-14 RX ADMIN — CEFEPIME HYDROCHLORIDE 2 G: 2 INJECTION, POWDER, FOR SOLUTION INTRAVENOUS at 10:32

## 2021-12-14 ASSESSMENT — ACTIVITIES OF DAILY LIVING (ADL)
ADLS_ACUITY_SCORE: 6

## 2021-12-14 ASSESSMENT — MIFFLIN-ST. JEOR: SCORE: 1634.38

## 2021-12-14 NOTE — PLAN OF CARE
Neuro: A&Ox4. Able to make needs known.   Cardiac: SR/SB. Rates 40s-50s.   Respiratory: Sating >92% on 70% 50L HFNC. SHAH. Increased O2 needs with activity, 80-90% FiO2.   GI/: Adequate urine output. Uses urinal. LBM 12/13. Continent.   Diet/appetite: Regular diet.   Activity:  Independently moves from bed to chair/BSC.   Pain: Denied any pain.   Skin: No new deficits noted.   LDA's: PIV x1 R arm SL.     Plan: Continue with POC. Notify primary team with changes.

## 2021-12-14 NOTE — PLAN OF CARE
Neuro: A&Ox4.   Cardiac: SR. VSS.   Respiratory: Sating %90> on HFNC 60% 50L  GI/: Adequate urine output. BM X1  Diet/appetite: Tolerating Regular diet. Eating well.  Activity:  Independent, up to chair.  Pain: At acceptable level on current regimen.   Skin: No new deficits noted.  LDA's:    Plan: Continue with POC. Notify primary team with changes.

## 2021-12-14 NOTE — PROGRESS NOTES
[facility]    Progress Note - Maroon 2 Service        Date of Admission:  12/8/2021    Assessment & Plan             Juan June is a 61 year old male with PMH dyslipidemia, EMILY, GERD who was admitted on 12/8/2021 acute hypoxemic respiratory failure secondary to COVID19 PNA. Started HAP treatement on 12/12 given new leukocytosis with persistent HFNC needs and inability to wean. At this time, his HFNC needs remain stable and is is otherwise hemodynamically stable.      Today:  - c/w dexamethasone + barencitinib  - Switch cefepime to cefdinir and azithromycin (more likely CAP)  - Pt continues on HFNC with decrease in O2 requirements     #Acute Hypoxic Respiratory Failure 2/2 Covid-19 PNA:  #Concern for superimposed CAP, less likely HAP:  Admitted to [place] on 12/6 for acute hypoxia. Symptoms and positive test around 12/1 (pt unsure of date). D. Dimer 0.075 on admission. On 12/12, pt developed leukocytosis, with increased CRP. While it is possible this is progression of his Covid-19, concern for superimposed bacterial infection and given timeline, likely CAP, less likely HAP. CXR 12/12 notable for peripheral basilar predominant opacities, likely infective. Sputum culture w/ oral contaminants, UA negative.   -Covid-19 therapies:              -c/w dexamethasone (12/8-p)              -c/w barencitinib (12/8-p)              -Pt declined remdesivir  - DVT ppx: Lovenox qday  - Concern for CAP, less likely HAP:  - Bcx x2 NGTD  - Cefepime (12/12-12/14)  - Vanc discontinued due to negative MRSA nares  - Switched to Cefdinir, Azithromycin (12/14-p) for CAP coverage. Less concern for pseudomonas  - Oxygen support: decreased to 55% on 40L     # Elevated liver enzymes, stable  ALT and AST elevated on admission- these have remained stable since admission. Alkaline phosphatase is normal. Pt does not endorse any abdominal pain. No hx of elevated liver enzymes. Likely 2/2  covid-19 inflammation.   -Continue to trend liver enzymes              -Will space CMP to q2 days  -Consider RUQ ultrasound if worsening enzymes or abdominal pain.      # GERD:  Takes omeprazole at home. Placed on protonix in ICU  -c/w pantoprazole 40mg daily.      Diet: Regular Diet Adult    DVT Prophylaxis: Enoxaparin (Lovenox) SQ  Correa Catheter: Not present  Fluids: None  Central Lines: None  Code Status: Full Code      Disposition Plan   Expected Discharge: 12/17/2021     Anticipated discharge location:  Awaiting care coordination huddle  Delays:           The patient's care was discussed with the Attending Physician, Dr. Middleton.    Juan Goodman MD  [address]  Securely message with the Vocera Web Console (learn more here)  Text page via [employer] Paging/Directory    Please see sign in/sign out for up to date coverage information    Clinically Significant Risk Factors Present on Admission                ______________________________________________________________________    Interval History   No acute events overnight. He continues to have some shortness of breath same as past few days. Denies any fevers, chills. Has some cough with productive sputum that is decreased from the past few days. No abdominal pain, nausea, vomiting, constipation, diarrhea.     Data reviewed today: I reviewed all medications, new labs and imaging results over the last 24 hours.    Physical Exam   Vital Signs: Temp: 98.2  F (36.8  C) Temp src: Oral BP: 127/86 Pulse: 63   Resp: 20 SpO2: 93 % O2 Device: High Flow Nasal Cannula (HFNC) Oxygen Delivery: 50 LPM  Weight: 184 lbs 15.46 oz  Constitutional: awake, alert, cooperative, no apparent distress, and appears stated age  ENT: HFNC in place  Respiratory: No increased work of breathing, good air exchange, clear to auscultation bilaterally, no crackles or wheezing  Cardiovascular: Normal apical impulse, regular rate and  rhythm, normal S1 and S2, no S3 or S4, and no murmur noted  GI: Normal bowel sounds, soft, non-distended, non-tender    Data   Recent Labs   Lab 12/14/21  0458 12/13/21 0456 12/12/21  0455 12/11/21  0605 12/10/21  1432 12/10/21  0515   WBC 9.0 12.0* 11.4* 10.9  --  7.9   HGB 13.5 14.0 14.7 15.0  --  14.8   MCV 91 92 92 96  --  91    358 345 322  --  254   INR  --   --   --   --   --  1.03   NA  --  136 135 135  --  136   POTASSIUM  --  4.2 4.4 4.4  --  4.3   CHLORIDE  --  106 104 106  --  107   CO2  --  24 24 24  --  25   BUN  --  19 19 23  --  25   CR 0.76 0.77 0.78 0.84  --  0.77   ANIONGAP  --  6 7 5  --  4   AVERY  --  8.2* 8.5 8.4*  --  8.2*   GLC  --  94 87 90   < > 88   ALBUMIN  --  2.3* 2.5* 2.5*  --  2.5*   PROTTOTAL  --  6.0* 6.2* 6.1*  --  6.1*   BILITOTAL  --  0.8 1.0 1.3  --  1.2   ALKPHOS  --  56 53 48  --  52   ALT  --  83* 86* 89*  --  90*   AST  --  47* 57* 55*  --  60*    < > = values in this interval not displayed.

## 2021-12-14 NOTE — PLAN OF CARE
Neuro: A&Ox4. PERRLA. Other neuros intact.  Cardiac: Sinus Nura-SR. SBPs 110s. OVSS.   Respiratory: Sating > 92% on HFNC 45%/40LPM at rest, weaned from 70%/50LPM.  GI/: Adequate urine output. BM X1  Diet/appetite: Tolerating reg diet. Eating well.  Activity: Independent. Up to chair and bedside commode  Pain: At acceptable level on current regimen.   Skin: No new deficits noted.  LDA's: 1x PIV RUE SL    Plan: Continue with POC. Notify primary team with changes.

## 2021-12-15 ENCOUNTER — APPOINTMENT (OUTPATIENT)
Dept: PHYSICAL THERAPY | Facility: CLINIC | Age: 61
DRG: 177 | End: 2021-12-15
Attending: ANESTHESIOLOGY
Payer: COMMERCIAL

## 2021-12-15 LAB
ALBUMIN SERPL-MCNC: 2.5 G/DL (ref 3.4–5)
ALP SERPL-CCNC: 52 U/L (ref 40–150)
ALT SERPL W P-5'-P-CCNC: 75 U/L (ref 0–70)
ANION GAP SERPL CALCULATED.3IONS-SCNC: 6 MMOL/L (ref 3–14)
AST SERPL W P-5'-P-CCNC: 34 U/L (ref 0–45)
BILIRUB SERPL-MCNC: 0.6 MG/DL (ref 0.2–1.3)
BUN SERPL-MCNC: 19 MG/DL (ref 7–30)
CALCIUM SERPL-MCNC: 8.4 MG/DL (ref 8.5–10.1)
CHLORIDE BLD-SCNC: 104 MMOL/L (ref 94–109)
CO2 SERPL-SCNC: 25 MMOL/L (ref 20–32)
CREAT SERPL-MCNC: 0.79 MG/DL (ref 0.66–1.25)
CRP SERPL-MCNC: 30 MG/L (ref 0–8)
ERYTHROCYTE [DISTWIDTH] IN BLOOD BY AUTOMATED COUNT: 12.1 % (ref 10–15)
GFR SERPL CREATININE-BSD FRML MDRD: >90 ML/MIN/1.73M2
GLUCOSE BLD-MCNC: 98 MG/DL (ref 70–99)
HCT VFR BLD AUTO: 40.8 % (ref 40–53)
HGB BLD-MCNC: 13.9 G/DL (ref 13.3–17.7)
MCH RBC QN AUTO: 31.4 PG (ref 26.5–33)
MCHC RBC AUTO-ENTMCNC: 34.1 G/DL (ref 31.5–36.5)
MCV RBC AUTO: 92 FL (ref 78–100)
PLATELET # BLD AUTO: 358 10E3/UL (ref 150–450)
POTASSIUM BLD-SCNC: 4.2 MMOL/L (ref 3.4–5.3)
PROT SERPL-MCNC: 6.2 G/DL (ref 6.8–8.8)
RBC # BLD AUTO: 4.42 10E6/UL (ref 4.4–5.9)
SODIUM SERPL-SCNC: 135 MMOL/L (ref 133–144)
WBC # BLD AUTO: 10.2 10E3/UL (ref 4–11)

## 2021-12-15 PROCEDURE — 97110 THERAPEUTIC EXERCISES: CPT | Mod: GP

## 2021-12-15 PROCEDURE — 99207 PR NO CHARGE LOS: CPT | Performed by: STUDENT IN AN ORGANIZED HEALTH CARE EDUCATION/TRAINING PROGRAM

## 2021-12-15 PROCEDURE — 80053 COMPREHEN METABOLIC PANEL: CPT

## 2021-12-15 PROCEDURE — 250N000013 HC RX MED GY IP 250 OP 250 PS 637

## 2021-12-15 PROCEDURE — 99233 SBSQ HOSP IP/OBS HIGH 50: CPT | Mod: GC | Performed by: INTERNAL MEDICINE

## 2021-12-15 PROCEDURE — 250N000013 HC RX MED GY IP 250 OP 250 PS 637: Performed by: INTERNAL MEDICINE

## 2021-12-15 PROCEDURE — 85027 COMPLETE CBC AUTOMATED: CPT

## 2021-12-15 PROCEDURE — 94645 CONT INHLJ TX EACH ADDL HOUR: CPT

## 2021-12-15 PROCEDURE — 250N000013 HC RX MED GY IP 250 OP 250 PS 637: Performed by: STUDENT IN AN ORGANIZED HEALTH CARE EDUCATION/TRAINING PROGRAM

## 2021-12-15 PROCEDURE — 94660 CPAP INITIATION&MGMT: CPT

## 2021-12-15 PROCEDURE — 250N000012 HC RX MED GY IP 250 OP 636 PS 637: Performed by: PEDIATRICS

## 2021-12-15 PROCEDURE — 36415 COLL VENOUS BLD VENIPUNCTURE: CPT

## 2021-12-15 PROCEDURE — 999N000215 HC STATISTIC HFNC ADULT NON-CPAP

## 2021-12-15 PROCEDURE — 250N000011 HC RX IP 250 OP 636

## 2021-12-15 PROCEDURE — 86140 C-REACTIVE PROTEIN: CPT

## 2021-12-15 PROCEDURE — 120N000003 HC R&B IMCU UMMC

## 2021-12-15 PROCEDURE — 999N000157 HC STATISTIC RCP TIME EA 10 MIN

## 2021-12-15 RX ADMIN — CEFDINIR 300 MG: 300 CAPSULE ORAL at 19:42

## 2021-12-15 RX ADMIN — BARICITINIB 4 MG: 2 TABLET, FILM COATED ORAL at 08:47

## 2021-12-15 RX ADMIN — AZITHROMYCIN DIHYDRATE 500 MG: 250 TABLET, FILM COATED ORAL at 08:47

## 2021-12-15 RX ADMIN — DEXAMETHASONE 6 MG: 2 TABLET ORAL at 08:47

## 2021-12-15 RX ADMIN — ENOXAPARIN SODIUM 40 MG: 40 INJECTION SUBCUTANEOUS at 08:47

## 2021-12-15 RX ADMIN — CEFDINIR 300 MG: 300 CAPSULE ORAL at 08:47

## 2021-12-15 RX ADMIN — PANTOPRAZOLE SODIUM 40 MG: 40 TABLET, DELAYED RELEASE ORAL at 08:47

## 2021-12-15 ASSESSMENT — ACTIVITIES OF DAILY LIVING (ADL)
ADLS_ACUITY_SCORE: 6

## 2021-12-15 ASSESSMENT — MIFFLIN-ST. JEOR: SCORE: 1631.38

## 2021-12-15 NOTE — PLAN OF CARE
Neuro: A&Ox4.   Cardiac: SR. VSS.   Respiratory: Sating %90> on HFNC 55% 40L  GI/: Adequate urine output. BM X1  Diet/appetite: Tolerating Regular diet. Eating well.  Activity:  Independent up to chair and in halls.  Pain: At acceptable level on current regimen.   Skin: No new deficits noted.  LDA's:    Plan: Continue with POC. Notify primary team with changes.

## 2021-12-15 NOTE — PROGRESS NOTES
Infectious Disease Curbside Note  I was called by Dr. Goodman on 12/15/2021 at 11:17 AM to provide input for Juan June MRN [id number]. The patient is located at [facility].. The nature of this request for a curbside consultation does not permit me to perform a comprehensive review of health care records, patient/family interview, nor an examination of the patient. I obtained limited patient information from the provider on the phone call and a limited chart review.    Based on only the information I was provided today, I make the following recommendations to the treating provider/team for their review and consideration:     60 y/o gentleman, unvaccinated presents with acute hypoxic respiratory failure 2/2 COVID-19. On HFNC. Refused Remdesivir, started on Dexamethasone and Baricitinib on 12/8, today day 8. 2 days ago, concern for possible superimposed bacterial infection, mild leukocytosis (12k) and increased CRP, started on Cefdinir/Azithro with resolution of leukocytosis and improvement in CRP    At this time, with patient being 8 days into course of Baricitinib, and with apparent clinical response to antibiotic coverage, continue and complete course of Baricitinib while completing a short CAP course    These recommendations are not intended to take the place of the care team's clinical judgement, which should always be utilized to provide the most appropriate care to meet the unique needs of each patient. The recommendations offered were based on the limited scope of information provided as today's date. Should additional guidance be needed or required a formal consultation with infectious diseases is recommended.

## 2021-12-15 NOTE — PLAN OF CARE
Neuro: A&Ox4. Able to make needs known.   Cardiac: SR/SB. VSS.   Respiratory: Sating >92% on 40L 40% FiO2. Tachypnic. SHAH.   GI/: Adequate urine output. Using urinal. LBM 12/14.   Diet/appetite: Tolerating regular diet.   Activity:  Independently gets up to chair/BSC.   Pain: Denied any pain.   Skin: No new deficits noted.   LDA's: PIV R arm SL    Plan: Continue with POC. Notify primary team with changes.

## 2021-12-16 LAB
ANION GAP SERPL CALCULATED.3IONS-SCNC: 7 MMOL/L (ref 3–14)
BUN SERPL-MCNC: 19 MG/DL (ref 7–30)
CALCIUM SERPL-MCNC: 8.8 MG/DL (ref 8.5–10.1)
CHLORIDE BLD-SCNC: 103 MMOL/L (ref 94–109)
CO2 SERPL-SCNC: 25 MMOL/L (ref 20–32)
CREAT SERPL-MCNC: 0.87 MG/DL (ref 0.66–1.25)
ERYTHROCYTE [DISTWIDTH] IN BLOOD BY AUTOMATED COUNT: 12.2 % (ref 10–15)
GFR SERPL CREATININE-BSD FRML MDRD: >90 ML/MIN/1.73M2
GLUCOSE BLD-MCNC: 92 MG/DL (ref 70–99)
HCT VFR BLD AUTO: 41.7 % (ref 40–53)
HGB BLD-MCNC: 14.5 G/DL (ref 13.3–17.7)
MCH RBC QN AUTO: 31.4 PG (ref 26.5–33)
MCHC RBC AUTO-ENTMCNC: 34.8 G/DL (ref 31.5–36.5)
MCV RBC AUTO: 90 FL (ref 78–100)
PLATELET # BLD AUTO: 384 10E3/UL (ref 150–450)
POTASSIUM BLD-SCNC: 4.7 MMOL/L (ref 3.4–5.3)
RBC # BLD AUTO: 4.62 10E6/UL (ref 4.4–5.9)
SODIUM SERPL-SCNC: 135 MMOL/L (ref 133–144)
WBC # BLD AUTO: 10.7 10E3/UL (ref 4–11)

## 2021-12-16 PROCEDURE — 250N000012 HC RX MED GY IP 250 OP 636 PS 637: Performed by: PEDIATRICS

## 2021-12-16 PROCEDURE — 999N000215 HC STATISTIC HFNC ADULT NON-CPAP

## 2021-12-16 PROCEDURE — 36415 COLL VENOUS BLD VENIPUNCTURE: CPT

## 2021-12-16 PROCEDURE — 80048 BASIC METABOLIC PNL TOTAL CA: CPT

## 2021-12-16 PROCEDURE — 250N000011 HC RX IP 250 OP 636

## 2021-12-16 PROCEDURE — 85018 HEMOGLOBIN: CPT

## 2021-12-16 PROCEDURE — 250N000013 HC RX MED GY IP 250 OP 250 PS 637: Performed by: INTERNAL MEDICINE

## 2021-12-16 PROCEDURE — 250N000013 HC RX MED GY IP 250 OP 250 PS 637: Performed by: STUDENT IN AN ORGANIZED HEALTH CARE EDUCATION/TRAINING PROGRAM

## 2021-12-16 PROCEDURE — 120N000003 HC R&B IMCU UMMC

## 2021-12-16 PROCEDURE — 999N000043 HC STATISTIC CTO2 CONT OXYGEN TECH TIME EA 90 MIN

## 2021-12-16 PROCEDURE — 999N000157 HC STATISTIC RCP TIME EA 10 MIN

## 2021-12-16 PROCEDURE — 99233 SBSQ HOSP IP/OBS HIGH 50: CPT | Mod: GC | Performed by: INTERNAL MEDICINE

## 2021-12-16 RX ADMIN — AZITHROMYCIN DIHYDRATE 500 MG: 250 TABLET, FILM COATED ORAL at 08:41

## 2021-12-16 RX ADMIN — CEFDINIR 300 MG: 300 CAPSULE ORAL at 08:41

## 2021-12-16 RX ADMIN — ENOXAPARIN SODIUM 40 MG: 40 INJECTION SUBCUTANEOUS at 08:41

## 2021-12-16 RX ADMIN — PANTOPRAZOLE SODIUM 40 MG: 40 TABLET, DELAYED RELEASE ORAL at 08:41

## 2021-12-16 RX ADMIN — CEFDINIR 300 MG: 300 CAPSULE ORAL at 20:39

## 2021-12-16 RX ADMIN — DEXAMETHASONE 6 MG: 2 TABLET ORAL at 08:41

## 2021-12-16 RX ADMIN — BARICITINIB 4 MG: 2 TABLET, FILM COATED ORAL at 08:41

## 2021-12-16 ASSESSMENT — ACTIVITIES OF DAILY LIVING (ADL)
ADLS_ACUITY_SCORE: 4
ADLS_ACUITY_SCORE: 6
ADLS_ACUITY_SCORE: 4
ADLS_ACUITY_SCORE: 6
ADLS_ACUITY_SCORE: 4
ADLS_ACUITY_SCORE: 6
ADLS_ACUITY_SCORE: 4
ADLS_ACUITY_SCORE: 6
ADLS_ACUITY_SCORE: 4
ADLS_ACUITY_SCORE: 4
ADLS_ACUITY_SCORE: 6
ADLS_ACUITY_SCORE: 4
ADLS_ACUITY_SCORE: 6
ADLS_ACUITY_SCORE: 4
ADLS_ACUITY_SCORE: 6
ADLS_ACUITY_SCORE: 4
ADLS_ACUITY_SCORE: 6
ADLS_ACUITY_SCORE: 4
ADLS_ACUITY_SCORE: 4
ADLS_ACUITY_SCORE: 6

## 2021-12-16 ASSESSMENT — MIFFLIN-ST. JEOR
SCORE: 1625.38
SCORE: 1642.25

## 2021-12-16 NOTE — PROGRESS NOTES
[facility]    Progress Note - Maroon 2 Service        Date of Admission:  12/8/2021    Assessment & Plan           Juan June is a 61 year old male with PMH dyslipidemia, EMILY, GERD who was admitted on 12/8/2021 acute hypoxemic respiratory failure secondary to COVID19 PNA. Started HAP treatement on 12/12 given new leukocytosis with persistent HFNC needs and inability to wean. At this time, his HFNC needs remain stable and is is otherwise hemodynamically stable.      Today:  - c/w dexamethasone + barencitinib (12/8-p)  - Continue cefdinir (5 day course)  - Pt continues on HFNC with decrease in O2 requirements  - Lab holiday tomorrow      #Acute Hypoxic Respiratory Failure 2/2 Covid-19 PNA:  #Concern for superimposed CAP, less likely HAP:  Admitted to [place] on 12/6 for acute hypoxia. Symptoms and positive test around 12/1 (pt unsure of date). D. Dimer 0.075 on admission. On 12/12, pt developed leukocytosis, with increased CRP. While it is possible this is progression of his Covid-19, concern for superimposed bacterial infection and given timeline, likely CAP, less likely HAP. CXR 12/12 notable for peripheral basilar predominant opacities, likely infective. Sputum culture w/ oral contaminants, UA negative.   -Covid-19 therapies:              -c/w dexamethasone (12/8-p). Stop date 12/17              -c/w barencitinib (12/8-p). Stop date 12/21              -Pt declined remdesivir  - DVT ppx: Lovenox qday  - Bcx x2 (12/12/21) NGTD  - For concern for CAP, less likely HAP:   - Cefepime (12/12-12/14)  - Vanc discontinued due to negative MRSA nares  - Switched to Cefdinir, Azithromycin (12/14-p). Stop date 12/18     # Elevated liver enzymes, stable  ALT and AST elevated on admission- these have remained stable since admission. Alkaline phosphatase is normal. Pt does not endorse any abdominal pain. No hx of elevated liver enzymes. Likely 2/2 covid-19 inflammation.   -  Continue to monitor  -Consider RUQ ultrasound if worsening enzymes or abdominal pain.      # GERD:  Takes omeprazole at home. Placed on protonix in ICU  -c/w pantoprazole 40mg daily.      Diet: Regular Diet Adult    DVT Prophylaxis: Enoxaparin (Lovenox) SQ  Correa Catheter: Not present  Fluids: None  Central Lines: None  Code Status: Full Code      Disposition Plan   Expected Discharge: 12/20/2021     Anticipated discharge location:  Awaiting care coordination huddle  Delays:           The patient's care was discussed with the Attending Physician, Dr. Middleton.    Juan Goodman MD  [address]  Securely message with the Vocera Web Console (learn more here)  Text page via [employer] Paging/Directory    Please see sign in/sign out for up to date coverage information    Clinically Significant Risk Factors Present on Admission                ______________________________________________________________________    Interval History   No acute events overnight. He continues to do his exercises with bands. Denies any fevers, chills, chest pain, abdominal pain, nausea, vomiting, diarrhea. Does continue to have shortness of breath and nonproductive cough unchanged from yesterday.     Data reviewed today: I reviewed all medications, new labs and imaging results over the last 24 hours.     Physical Exam   Vital Signs: Temp: 98.3  F (36.8  C) Temp src: Oral BP: 109/71 Pulse: 70   Resp: 20 SpO2: 97 % O2 Device: High Flow Nasal Cannula (HFNC) Oxygen Delivery: 40 LPM  Weight: 182 lbs 15.71 oz  Constitutional: awake, alert, cooperative, no apparent distress, and appears stated age  ENT: HFNC in place  Respiratory: No increased work of breathing, good air exchange, clear to auscultation bilaterally.   Cardiovascular: Regular rate and rhythm, normal S1 and S2, no S3 or S4, and no murmur noted. No edema noted.   GI: Normal bowel sounds, soft, non-distended, non-tender    Data    Recent Labs   Lab 12/16/21  0448 12/15/21  0529 12/14/21  0458 12/13/21  0456 12/10/21  1432 12/10/21  0515   WBC 10.7 10.2 9.0 12.0*   < > 7.9   HGB 14.5 13.9 13.5 14.0   < > 14.8   MCV 90 92 91 92   < > 91    358 322 358   < > 254   INR  --   --   --   --   --  1.03    135  --  136   < > 136   POTASSIUM 4.7 4.2  --  4.2   < > 4.3   CHLORIDE 103 104  --  106   < > 107   CO2 25 25  --  24   < > 25   BUN 19 19  --  19   < > 25   CR 0.87 0.79 0.76 0.77   < > 0.77   ANIONGAP 7 6  --  6   < > 4   AVERY 8.8 8.4*  --  8.2*   < > 8.2*   GLC 92 98  --  94   < > 88   ALBUMIN  --  2.5*  --  2.3*   < > 2.5*   PROTTOTAL  --  6.2*  --  6.0*   < > 6.1*   BILITOTAL  --  0.6  --  0.8   < > 1.2   ALKPHOS  --  52  --  56   < > 52   ALT  --  75*  --  83*   < > 90*   AST  --  34  --  47*   < > 60*    < > = values in this interval not displayed.

## 2021-12-16 NOTE — PLAN OF CARE
Problem: Adult Inpatient Plan of Care  Goal: Plan of Care Review    Neuro: A&Ox4.   Cardiac: SR/SB. VSS.       Respiratory: Sating %90> on HFNC 55% 40L  GI/: Adequate urine output. Urinal   Diet/appetite: Tolerating Regular diet. Eating well.  Activity:  Independent up to chair and in room.  Pain: Pt denies any pain   Skin: No new deficits noted.  LDA's: R forearm peripheral     Plan: Continue with POC. Notify primary team with changes.

## 2021-12-16 NOTE — PROGRESS NOTES
[facility]    Progress Note - Maroon 2 Service        Date of Admission:  12/8/2021    Assessment & Plan           Juan June is a 61 year old male with PMH dyslipidemia, EMILY, GERD who was admitted on 12/8/2021 acute hypoxemic respiratory failure secondary to COVID19 PNA. Started HAP treatement on 12/12 given new leukocytosis with persistent HFNC needs and inability to wean. At this time, his HFNC needs remain stable and is is otherwise hemodynamically stable.      Today:  - c/w dexamethasone + barencitinib (12/8-p)  - Continue cefdinir and azithromycin   - Pt continues on HFNC with decrease in O2 requirements     #Acute Hypoxic Respiratory Failure 2/2 Covid-19 PNA:  #Concern for superimposed CAP, less likely HAP:  Admitted to [place] on 12/6 for acute hypoxia. Symptoms and positive test around 12/1 (pt unsure of date). D. Dimer 0.075 on admission. On 12/12, pt developed leukocytosis, with increased CRP. While it is possible this is progression of his Covid-19, concern for superimposed bacterial infection and given timeline, likely CAP, less likely HAP. CXR 12/12 notable for peripheral basilar predominant opacities, likely infective. Sputum culture w/ oral contaminants, UA negative.   -Covid-19 therapies:              -c/w dexamethasone (12/8-p)              -c/w barencitinib (12/8-p)              -Pt declined remdesivir  - DVT ppx: Lovenox qday  - Bcx x2 (12/12/21) NGTD  - For concern for CAP, less likely HAP:   - Cefepime (12/12-12/14)  - Vanc discontinued due to negative MRSA nares  - Switched to Cefdinir, Azithromycin (12/14-p)      # Elevated liver enzymes, stable  ALT and AST elevated on admission- these have remained stable since admission. Alkaline phosphatase is normal. Pt does not endorse any abdominal pain. No hx of elevated liver enzymes. Likely 2/2 covid-19 inflammation.   -Continue to trend liver enzymes              -Will space CMP to q2  days  -Consider RUQ ultrasound if worsening enzymes or abdominal pain.      # GERD:  Takes omeprazole at home. Placed on protonix in ICU  -c/w pantoprazole 40mg daily.      Diet: Regular Diet Adult    DVT Prophylaxis: Enoxaparin (Lovenox) SQ  Correa Catheter: Not present  Fluids: None  Central Lines: None  Code Status: Full Code      Disposition Plan   Expected Discharge: 12/20/2021     Anticipated discharge location:  Awaiting care coordination huddle  Delays:            The patient's care was discussed with the Attending Physician, Dr. Middleton.    Juan Goodman MD  [address]  Securely message with the Vocera Web Console (learn more here)  Text page via [facility] Paging/Directory    Please see sign in/sign out for up to date coverage information    Clinically Significant Risk Factors Present on Admission                ______________________________________________________________________    Interval History   No acute events overnight. Continues to have SOB with exertion. No fevers, chills, chest pain, abd pain, nausea, vomiting, constipation, diarrhea. Continues to have some productive cough.     Data reviewed today: I reviewed all medications, new labs and imaging results over the last 24 hours.     Physical Exam   Vital Signs: Temp: 98  F (36.7  C) Temp src: Oral BP: 98/54 Pulse: 67   Resp: 24 SpO2: 96 % O2 Device: High Flow Nasal Cannula (HFNC) Oxygen Delivery: 40 LPM  Weight: 184 lbs 4.87 oz  Constitutional: awake, alert, cooperative, no apparent distress, and appears stated age  ENT: HFNC in place  Respiratory: No increased work of breathing, good air exchange, clear to auscultation bilaterally. Bibasilar mild crackles.   Cardiovascular: Normal apical impulse, regular rate and rhythm, normal S1 and S2, no S3 or S4, and no murmur noted  GI: Normal bowel sounds, soft, non-distended, non-tender    Data   Recent Labs   Lab 12/15/21  0529 12/14/21  1665  12/13/21  0456 12/12/21  0455 12/10/21  1432 12/10/21  0515   WBC 10.2 9.0 12.0* 11.4*   < > 7.9   HGB 13.9 13.5 14.0 14.7   < > 14.8   MCV 92 91 92 92   < > 91    322 358 345   < > 254   INR  --   --   --   --   --  1.03     --  136 135   < > 136   POTASSIUM 4.2  --  4.2 4.4   < > 4.3   CHLORIDE 104  --  106 104   < > 107   CO2 25  --  24 24   < > 25   BUN 19  --  19 19   < > 25   CR 0.79 0.76 0.77 0.78   < > 0.77   ANIONGAP 6  --  6 7   < > 4   AVERY 8.4*  --  8.2* 8.5   < > 8.2*   GLC 98  --  94 87   < > 88   ALBUMIN 2.5*  --  2.3* 2.5*   < > 2.5*   PROTTOTAL 6.2*  --  6.0* 6.2*   < > 6.1*   BILITOTAL 0.6  --  0.8 1.0   < > 1.2   ALKPHOS 52  --  56 53   < > 52   ALT 75*  --  83* 86*   < > 90*   AST 34  --  47* 57*   < > 60*    < > = values in this interval not displayed.

## 2021-12-16 NOTE — PROGRESS NOTES
CLINICAL NUTRITION SERVICES    Reviewed nutrition risk factors due to LOS. Pt is tolerating diet, eating well per nursing documentation. No nutrition issues identified at this time. RD will follow via rounds at this time, unless consulted.    Lashawn Pike RD, LD  6B pager [phone]

## 2021-12-16 NOTE — PLAN OF CARE
Neuro: A&Ox4.   Cardiac: SR. VSS.   Respiratory: Sating 90% > on HFNC 40%  GI/: Adequate urine output. BM X1  Diet/appetite: Tolerating Regular diet. Eating well.  Activity:  Independent, up to chair.  Pain: At acceptable level on current regimen.   Skin: No new deficits noted.  LDA's:    Plan: Continue with POC. Notify primary team with changes.

## 2021-12-17 ENCOUNTER — APPOINTMENT (OUTPATIENT)
Dept: PHYSICAL THERAPY | Facility: CLINIC | Age: 61
DRG: 177 | End: 2021-12-17
Attending: ANESTHESIOLOGY
Payer: COMMERCIAL

## 2021-12-17 LAB
BACTERIA BLD CULT: NO GROWTH
BACTERIA BLD CULT: NO GROWTH
CREAT SERPL-MCNC: 0.8 MG/DL (ref 0.66–1.25)
GFR SERPL CREATININE-BSD FRML MDRD: >90 ML/MIN/1.73M2
PLATELET # BLD AUTO: 357 10E3/UL (ref 150–450)

## 2021-12-17 PROCEDURE — 999N000157 HC STATISTIC RCP TIME EA 10 MIN

## 2021-12-17 PROCEDURE — 999N000215 HC STATISTIC HFNC ADULT NON-CPAP

## 2021-12-17 PROCEDURE — 82565 ASSAY OF CREATININE: CPT | Performed by: STUDENT IN AN ORGANIZED HEALTH CARE EDUCATION/TRAINING PROGRAM

## 2021-12-17 PROCEDURE — 85049 AUTOMATED PLATELET COUNT: CPT | Performed by: STUDENT IN AN ORGANIZED HEALTH CARE EDUCATION/TRAINING PROGRAM

## 2021-12-17 PROCEDURE — 999N000043 HC STATISTIC CTO2 CONT OXYGEN TECH TIME EA 90 MIN

## 2021-12-17 PROCEDURE — 97110 THERAPEUTIC EXERCISES: CPT | Mod: GP

## 2021-12-17 PROCEDURE — 99233 SBSQ HOSP IP/OBS HIGH 50: CPT | Performed by: INTERNAL MEDICINE

## 2021-12-17 PROCEDURE — 36415 COLL VENOUS BLD VENIPUNCTURE: CPT | Performed by: STUDENT IN AN ORGANIZED HEALTH CARE EDUCATION/TRAINING PROGRAM

## 2021-12-17 PROCEDURE — 250N000011 HC RX IP 250 OP 636

## 2021-12-17 PROCEDURE — 250N000013 HC RX MED GY IP 250 OP 250 PS 637: Performed by: STUDENT IN AN ORGANIZED HEALTH CARE EDUCATION/TRAINING PROGRAM

## 2021-12-17 PROCEDURE — 250N000012 HC RX MED GY IP 250 OP 636 PS 637: Performed by: PEDIATRICS

## 2021-12-17 PROCEDURE — 120N000003 HC R&B IMCU UMMC

## 2021-12-17 RX ADMIN — DEXAMETHASONE 6 MG: 2 TABLET ORAL at 08:01

## 2021-12-17 RX ADMIN — ENOXAPARIN SODIUM 40 MG: 40 INJECTION SUBCUTANEOUS at 08:01

## 2021-12-17 RX ADMIN — BARICITINIB 4 MG: 2 TABLET, FILM COATED ORAL at 08:00

## 2021-12-17 RX ADMIN — PANTOPRAZOLE SODIUM 40 MG: 40 TABLET, DELAYED RELEASE ORAL at 08:00

## 2021-12-17 ASSESSMENT — ACTIVITIES OF DAILY LIVING (ADL)
ADLS_ACUITY_SCORE: 4

## 2021-12-17 ASSESSMENT — MIFFLIN-ST. JEOR: SCORE: 1625.38

## 2021-12-17 NOTE — PROGRESS NOTES
[facility]    Progress Note - Maroon 2 Service        Date of Admission:  12/8/2021    Assessment & Plan           Juan June is a 61 year old male with PMH dyslipidemia, EMILY, GERD who was admitted on 12/8/2021 acute hypoxemic respiratory failure secondary to COVID19 PNA. Started HAP treatement on 12/12 given new leukocytosis with persistent HFNC needs and inability to wean. At this time, his HFNC needs remain stable and is is otherwise hemodynamically stable.     Changes today: Completed treatment for pneumonia. Working on weaning HFNC     #Acute Hypoxic Respiratory Failure 2/2 Covid-19 PNA:  #Concern for superimposed CAP, less likely HAP:  Admitted to [place] on 12/6 for acute hypoxia. Symptoms and positive test around 12/1 (pt unsure of date). D. Dimer 0.075 on admission. On 12/12, pt developed leukocytosis, with increased CRP. While it is possible this is progression of his Covid-19, concern for superimposed bacterial infection and given timeline, likely CAP, less likely HAP. CXR 12/12 notable for peripheral basilar predominant opacities, likely infective. Sputum culture w/ oral contaminants, UA negative.   -Covid-19 therapies:              -c/w dexamethasone (12/8-p). Stop date 12/17              -c/w barencitinib (12/8-p). Stop date 12/21              -Pt declined remdesivir  - DVT ppx: Lovenox qday  - Bcx x2 (12/12/21) NGTD  - For concern for CAP, less likely HAP:   - Cefepime (12/12-12/14)  - Vanc discontinued due to negative MRSA nares  - Switched to Cefdinir, Azithromycin (12/14-p). Stop date 12/17     # Elevated liver enzymes, stable  ALT and AST elevated on admission- these have remained stable since admission. Alkaline phosphatase is normal. Pt does not endorse any abdominal pain. No hx of elevated liver enzymes. Likely 2/2 covid-19 inflammation.   - Continue to monitor  -Consider RUQ ultrasound if worsening enzymes or abdominal pain.      #  GERD:  Takes omeprazole at home. Placed on protonix in ICU  -c/w pantoprazole 40mg daily.      Diet: Regular Diet Adult    DVT Prophylaxis: Enoxaparin (Lovenox) SQ  Correa Catheter: Not present  Fluids: None  Central Lines: None  Code Status: Full Code      Disposition Plan   Expected Discharge: 12/22/2021     Anticipated discharge location:  Awaiting care coordination huddle  Delays:         The patient's care was discussed with the Bedside Nurse and Patient.    Marcellus Middleton MD  [address]  Securely message with the Vocera Web Console (learn more here)  Text page via [facility] Paging/Directory    Please see sign in/sign out for up to date coverage information    Clinically Significant Risk Factors Present on Admission                ______________________________________________________________________    Interval History   Reports he feels OK today. Breathing is stable to maybe slightly improving over the past few days. Still coughing occasionally. Eating well, normal bowel movements, no fever/chills.    Data reviewed today: I reviewed all medications, new labs and imaging results over the last 24 hours.     Physical Exam   Vital Signs: Temp: 97.6  F (36.4  C) Temp src: Oral BP: 101/59 Pulse: 63   Resp: 18 SpO2: 100 % O2 Device: High Flow Nasal Cannula (HFNC) Oxygen Delivery: 40 LPM  Weight: 182 lbs 15.71 oz  Constitutional: awake, alert, cooperative, no apparent distress, and appears stated age  ENT: HFNC in place  Respiratory: No increased work of breathing, good air exchange, clear to auscultation bilaterally.   Cardiovascular: Regular rate and rhythm, normal S1 and S2, no S3 or S4, and no murmur noted. No edema noted.     Data   Recent Labs   Lab 12/17/21  0453 12/16/21  0448 12/15/21  0529 12/14/21  0458 12/13/21  0456   WBC  --  10.7 10.2 9.0 12.0*   HGB  --  14.5 13.9 13.5 14.0   MCV  --  90 92 91 92    384 358 322 358   NA  --  135  135  --  136   POTASSIUM  --  4.7 4.2  --  4.2   CHLORIDE  --  103 104  --  106   CO2  --  25 25  --  24   BUN  --  19 19  --  19   CR 0.80 0.87 0.79 0.76 0.77   ANIONGAP  --  7 6  --  6   AVERY  --  8.8 8.4*  --  8.2*   GLC  --  92 98  --  94   ALBUMIN  --   --  2.5*  --  2.3*   PROTTOTAL  --   --  6.2*  --  6.0*   BILITOTAL  --   --  0.6  --  0.8   ALKPHOS  --   --  52  --  56   ALT  --   --  75*  --  83*   AST  --   --  34  --  47*

## 2021-12-17 NOTE — PLAN OF CARE
Neuro: A&Ox4. No new neuro deficit. Uses call light appropriately. Makes needs known.   Cardiac: SR-SB. HR 50-60s. VSS.    Respiratory: Sating 95% On HFNC 50% FiO2, 40LPM   GI/: Adequate urine output. Uses the urinal and commode. BM x1   Diet/appetite: Tolerating regular diet. Good appetite.   Activity:  Ambulates independently to the commode and in the room.   Pain: At acceptable level on current regimen. Denies pain   Skin: No new deficits noted.  LDA's: PIV.     Plan: Monitor 02 need. Continue with POC. Notify primary team with changes.

## 2021-12-18 ENCOUNTER — APPOINTMENT (OUTPATIENT)
Dept: PHYSICAL THERAPY | Facility: CLINIC | Age: 61
DRG: 177 | End: 2021-12-18
Attending: ANESTHESIOLOGY
Payer: COMMERCIAL

## 2021-12-18 PROCEDURE — 999N000157 HC STATISTIC RCP TIME EA 10 MIN

## 2021-12-18 PROCEDURE — 999N000215 HC STATISTIC HFNC ADULT NON-CPAP

## 2021-12-18 PROCEDURE — 250N000011 HC RX IP 250 OP 636

## 2021-12-18 PROCEDURE — 250N000013 HC RX MED GY IP 250 OP 250 PS 637: Performed by: STUDENT IN AN ORGANIZED HEALTH CARE EDUCATION/TRAINING PROGRAM

## 2021-12-18 PROCEDURE — 97110 THERAPEUTIC EXERCISES: CPT | Mod: GP

## 2021-12-18 PROCEDURE — 120N000003 HC R&B IMCU UMMC

## 2021-12-18 PROCEDURE — 99233 SBSQ HOSP IP/OBS HIGH 50: CPT | Mod: GC | Performed by: INTERNAL MEDICINE

## 2021-12-18 RX ADMIN — ACETAMINOPHEN 325 MG: 325 TABLET, FILM COATED ORAL at 22:22

## 2021-12-18 RX ADMIN — ENOXAPARIN SODIUM 40 MG: 40 INJECTION SUBCUTANEOUS at 07:25

## 2021-12-18 RX ADMIN — BARICITINIB 4 MG: 2 TABLET, FILM COATED ORAL at 07:26

## 2021-12-18 RX ADMIN — PANTOPRAZOLE SODIUM 40 MG: 40 TABLET, DELAYED RELEASE ORAL at 07:26

## 2021-12-18 ASSESSMENT — ACTIVITIES OF DAILY LIVING (ADL)
ADLS_ACUITY_SCORE: 6
ADLS_ACUITY_SCORE: 6
ADLS_ACUITY_SCORE: 4
ADLS_ACUITY_SCORE: 6
ADLS_ACUITY_SCORE: 4
ADLS_ACUITY_SCORE: 6
ADLS_ACUITY_SCORE: 6
ADLS_ACUITY_SCORE: 4
ADLS_ACUITY_SCORE: 6
ADLS_ACUITY_SCORE: 4
ADLS_ACUITY_SCORE: 4
ADLS_ACUITY_SCORE: 6
ADLS_ACUITY_SCORE: 6
ADLS_ACUITY_SCORE: 4
ADLS_ACUITY_SCORE: 6
ADLS_ACUITY_SCORE: 4
ADLS_ACUITY_SCORE: 6
ADLS_ACUITY_SCORE: 4
ADLS_ACUITY_SCORE: 4
ADLS_ACUITY_SCORE: 6
ADLS_ACUITY_SCORE: 4
ADLS_ACUITY_SCORE: 4

## 2021-12-18 ASSESSMENT — MIFFLIN-ST. JEOR: SCORE: 1624.38

## 2021-12-18 NOTE — PLAN OF CARE
Neuro: A&Ox4.   Cardiac: SR. VSS.   Respiratory: Sating % on 4-8L Oxymask  GI/: Adequate urine output. BM X1  Diet/appetite: Tolerating Regular diet. Eating well.  Activity:  Independent  Pain: At acceptable level on current regimen.   Skin: No new deficits noted.  LDA's:    Plan: Continue with POC. Notify primary team with changes.

## 2021-12-18 NOTE — PLAN OF CARE
Neuro: WNL.   Cardiac: VSS/Afebrile.       Respiratory: Sating 90's on 5 - 7 L oxy mask. Needs increase in O2 with activity   GI/: Good UOP. No BM  Diet/appetite: No snacks.  Activity:  Up ad-sudhir..  Pain: 0 s/s of.   Skin: Unchanged.  LDA's:     Plan: Continue with POC. Notify primary team with changes.

## 2021-12-18 NOTE — PLAN OF CARE
Neuro: A&Ox4.   Cardiac: SR. VSS.   Respiratory: Sating % on 12L Oxymask  GI/: Adequate urine output. BM X1  Diet/appetite: Tolerating Regular diet. Eating well.  Activity:  Independent, up to chair and in halls.  Pain: At acceptable level on current regimen.   Skin: No new deficits noted.  LDA's:    Plan: Continue with POC. Notify primary team with changes.

## 2021-12-19 PROCEDURE — 120N000003 HC R&B IMCU UMMC

## 2021-12-19 PROCEDURE — 250N000013 HC RX MED GY IP 250 OP 250 PS 637: Performed by: STUDENT IN AN ORGANIZED HEALTH CARE EDUCATION/TRAINING PROGRAM

## 2021-12-19 PROCEDURE — 250N000013 HC RX MED GY IP 250 OP 250 PS 637: Performed by: INTERNAL MEDICINE

## 2021-12-19 PROCEDURE — 250N000011 HC RX IP 250 OP 636

## 2021-12-19 PROCEDURE — 99233 SBSQ HOSP IP/OBS HIGH 50: CPT | Performed by: INTERNAL MEDICINE

## 2021-12-19 PROCEDURE — 999N000157 HC STATISTIC RCP TIME EA 10 MIN

## 2021-12-19 RX ADMIN — BARICITINIB 4 MG: 2 TABLET, FILM COATED ORAL at 15:27

## 2021-12-19 RX ADMIN — ENOXAPARIN SODIUM 40 MG: 40 INJECTION SUBCUTANEOUS at 09:12

## 2021-12-19 RX ADMIN — PANTOPRAZOLE SODIUM 40 MG: 40 TABLET, DELAYED RELEASE ORAL at 09:12

## 2021-12-19 ASSESSMENT — ACTIVITIES OF DAILY LIVING (ADL)
ADLS_ACUITY_SCORE: 6
ADLS_ACUITY_SCORE: 4
ADLS_ACUITY_SCORE: 6
ADLS_ACUITY_SCORE: 4
ADLS_ACUITY_SCORE: 6
ADLS_ACUITY_SCORE: 4
ADLS_ACUITY_SCORE: 6
ADLS_ACUITY_SCORE: 6
ADLS_ACUITY_SCORE: 4
ADLS_ACUITY_SCORE: 6
ADLS_ACUITY_SCORE: 4

## 2021-12-19 ASSESSMENT — MIFFLIN-ST. JEOR: SCORE: 1625.38

## 2021-12-19 NOTE — PROGRESS NOTES
[facility]    Progress Note - Maroon 2 Service        Date of Admission:  12/8/2021    Assessment & Plan           Juan June is a 61 year old male with PMH dyslipidemia, EMILY, GERD who was admitted on 12/8/2021 acute hypoxemic respiratory failure secondary to COVID19 PNA. Started HAP treatement on 12/12 given new leukocytosis with persistent HFNC needs and inability to wean. At this time, his HFNC needs remain stable and is is otherwise hemodynamically stable.      Changes today:  - Transitioned off HFNC to oxymask!     #Acute Hypoxic Respiratory Failure 2/2 Covid-19 PNA:  #Concern for superimposed CAP, less likely HAP:  Admitted to [place] on 12/6 for acute hypoxia. Symptoms and positive test around 12/1 (pt unsure of date). D. Dimer 0.075 on admission. On 12/12, pt developed leukocytosis, with increased CRP. While it is possible this is progression of his Covid-19, concern for superimposed bacterial infection and given timeline, likely CAP, less likely HAP. CXR 12/12 notable for peripheral basilar predominant opacities, likely infective. Sputum culture w/ oral contaminants, UA negative.   -Covid-19 therapies:              -c/w dexamethasone (12/8-p). Stop date 12/17              -c/w barencitinib (12/8-p). Stop date 12/21              -Pt declined remdesivir  - DVT ppx: Lovenox qday  - Bcx x2 (12/12/21) NGTD  - For concern for CAP, less likely HAP:   - Cefepime (12/12-12/14)  - Vanc discontinued due to negative MRSA nares  - Switched to Cefdinir, Azithromycin (12/14-p). Stop date 12/17     # Elevated liver enzymes, stable  ALT and AST elevated on admission- these have remained stable since admission. Alkaline phosphatase is normal. Pt does not endorse any abdominal pain. No hx of elevated liver enzymes. Likely 2/2 covid-19 inflammation.   - Continue to monitor  - Consider RUQ ultrasound if worsening enzymes or abdominal pain.      # GERD:  Takes omeprazole  at home. Placed on protonix in ICU  -c/w pantoprazole 40mg daily.         Diet: Regular Diet Adult    DVT Prophylaxis: Enoxaparin (Lovenox) SQ  Correa Catheter: Not present  Fluids: None  Central Lines: None  Code Status: Full Code      Disposition Plan   Expected Discharge: 12/22/2021     Anticipated discharge location:  Awaiting care coordination huddle  Delays:            The patient's care was discussed with the Attending Physician, Dr. Middleton.    Juan Goodman MD  [address]  Securely message with the Vocera Web Console (learn more here)  Text page via [facility] Paging/Directory    Please see sign in/sign out for up to date coverage information    Clinically Significant Risk Factors Present on Admission                ______________________________________________________________________    Interval History   No acute events overnight.  He states that his shortness of breath has gotten better and was able to transition off the high flow to the oxygen mask.  He continues to work on his therapies.  Denies any chest pain, abdominal pain, nausea, vomiting, constipation, diarrhea.    Data reviewed today: I reviewed all medications, new labs and imaging results over the last 24 hours.     Physical Exam   Vital Signs: Temp: 97.4  F (36.3  C) Temp src: Oral BP: 106/63 Pulse: 76   Resp: 22 SpO2: 91 % O2 Device: Oxymask Oxygen Delivery: 7 LPM  Weight: 182 lbs 12.18 oz  Constitutional: awake, alert, cooperative, no apparent distress, and appears stated age  ENT: HFNC in place  Respiratory: No increased work of breathing, good air exchange, clear to auscultation bilaterally.   Cardiovascular: Regular rate and rhythm, normal S1 and S2, no S3 or S4, and no murmur noted. No edema noted.     Data   Recent Labs   Lab 12/17/21  0453 12/16/21  0448 12/15/21  0529 12/14/21  0458 12/13/21  0456   WBC  --  10.7 10.2 9.0 12.0*   HGB  --  14.5 13.9 13.5 14.0   MCV  --  90 92  91 92    384 358 322 358   NA  --  135 135  --  136   POTASSIUM  --  4.7 4.2  --  4.2   CHLORIDE  --  103 104  --  106   CO2  --  25 25  --  24   BUN  --  19 19  --  19   CR 0.80 0.87 0.79 0.76 0.77   ANIONGAP  --  7 6  --  6   AVERY  --  8.8 8.4*  --  8.2*   GLC  --  92 98  --  94   ALBUMIN  --   --  2.5*  --  2.3*   PROTTOTAL  --   --  6.2*  --  6.0*   BILITOTAL  --   --  0.6  --  0.8   ALKPHOS  --   --  52  --  56   ALT  --   --  75*  --  83*   AST  --   --  34  --  47*

## 2021-12-19 NOTE — PLAN OF CARE
Neuro: WNL.   Cardiac: VSS.       Respiratory: 4 to 8 L Oxy plus. Desats with activity.  GI/: Good UOP. BM x 1  Diet/appetite: No snacks.  Activity:  Up ad sudhir  Pain: Denies.   Skin: Unchanged.  LDA's:     Plan: Continue with POC. Notify primary team with changes.

## 2021-12-19 NOTE — PROGRESS NOTES
[facility]    Progress Note - [place] 2 Service        Date of Admission:  12/8/2021    Assessment & Plan           Juan June is a 61 year old male with PMH dyslipidemia, EMILY, GERD who was admitted on 12/8/2021 acute hypoxemic respiratory failure secondary to COVID19 PNA. Started HAP treatement on 12/12 given new leukocytosis with persistent HFNC needs and inability to wean. At this time, his HFNC needs remain stable and is is otherwise hemodynamically stable.     Changes today: Transfer to general medical floor from stepdown     #Acute Hypoxic Respiratory Failure 2/2 Covid-19 PNA:  #Concern for superimposed CAP, less likely HAP:  Admitted to [place] on 12/6 for acute hypoxia. Symptoms and positive test around 12/1 (pt unsure of date). D. Dimer 0.075 on admission. On 12/12, pt developed leukocytosis, with increased CRP. While it is possible this is progression of his Covid-19, concern for superimposed bacterial infection and given timeline, likely CAP, less likely HAP. CXR 12/12 notable for peripheral basilar predominant opacities, likely infective. Sputum culture w/ oral contaminants, UA negative. Blood cultures negative  -Covid-19 therapies:              -completed dexamethasone (12/8-17)              -c/w baricitinib (12/8-p). Last day 12/21              -Pt declined remdesivir  - DVT ppx: Lovenox qday  - For concern for CAP, less likely HAP: completed course of cefdinir + azithromycin     # Elevated liver enzymes, stable  ALT and AST elevated on admission- these have remained stable since admission. Alkaline phosphatase is normal. Pt does not endorse any abdominal pain. No hx of elevated liver enzymes. Likely 2/2 covid-19 inflammation.   - Continue to monitor     # GERD:  Takes omeprazole at home. Placed on protonix in ICU  -c/w pantoprazole 40mg daily.      Diet: Regular Diet Adult    DVT Prophylaxis: Enoxaparin (Lovenox) SQ  Correa Catheter: Not  present  Fluids: None  Central Lines: None  Code Status: Full Code      Disposition Plan   Expected Discharge: 12/22/2021     Anticipated discharge location:  Awaiting care coordination huddle  Delays:     Oxygen Needs       The patient's care was discussed with the Bedside Nurse and Patient.    Marcellus Middleton MD  [address]  Securely message with the Vocera Web Console (learn more here)  Text page via [facility] Paging/Directory    Please see sign in/sign out for up to date coverage information    Clinically Significant Risk Factors Present on Admission                ______________________________________________________________________    Interval History   Reports his energy is better today than yesterday. Able to do more therapies around the room. Breathing is about the same as the past few days; no real difference in shortness of breath. No fever/chills.    Data reviewed today: I reviewed all medications, new labs and imaging results over the last 24 hours.     Physical Exam   Vital Signs: Temp: 97.4  F (36.3  C) Temp src: Oral BP: 96/59 Pulse: 66   Resp: 20 SpO2: 97 % O2 Device: Oxi Plus Oxygen Delivery: 5 LPM  Weight: 182 lbs 15.71 oz  Constitutional: awake, alert, cooperative, no apparent distress, and appears stated age  ENT: Facemask in place  Respiratory: No increased work of breathing, good air exchange, clear to auscultation bilaterally.   Cardiovascular: Regular rate and rhythm, normal S1 and S2, no S3 or S4, and no murmur noted. No edema noted.     Data   Recent Labs   Lab 12/17/21  0453 12/16/21  0448 12/15/21  0529 12/14/21  0458 12/13/21  0456   WBC  --  10.7 10.2 9.0 12.0*   HGB  --  14.5 13.9 13.5 14.0   MCV  --  90 92 91 92    384 358 322 358   NA  --  135 135  --  136   POTASSIUM  --  4.7 4.2  --  4.2   CHLORIDE  --  103 104  --  106   CO2  --  25 25  --  24   BUN  --  19 19  --  19   CR 0.80 0.87 0.79 0.76 0.77   ANIONGAP   --  7 6  --  6   AVERY  --  8.8 8.4*  --  8.2*   GLC  --  92 98  --  94   ALBUMIN  --   --  2.5*  --  2.3*   PROTTOTAL  --   --  6.2*  --  6.0*   BILITOTAL  --   --  0.6  --  0.8   ALKPHOS  --   --  52  --  56   ALT  --   --  75*  --  83*   AST  --   --  34  --  47*

## 2021-12-20 ENCOUNTER — APPOINTMENT (OUTPATIENT)
Dept: PHYSICAL THERAPY | Facility: CLINIC | Age: 61
DRG: 177 | End: 2021-12-20
Attending: ANESTHESIOLOGY
Payer: COMMERCIAL

## 2021-12-20 LAB
ALBUMIN SERPL-MCNC: 2.4 G/DL (ref 3.4–5)
ALP SERPL-CCNC: 56 U/L (ref 40–150)
ALT SERPL W P-5'-P-CCNC: 126 U/L (ref 0–70)
ANION GAP SERPL CALCULATED.3IONS-SCNC: 7 MMOL/L (ref 3–14)
AST SERPL W P-5'-P-CCNC: 44 U/L (ref 0–45)
BILIRUB SERPL-MCNC: 0.8 MG/DL (ref 0.2–1.3)
BUN SERPL-MCNC: 17 MG/DL (ref 7–30)
CALCIUM SERPL-MCNC: 8.3 MG/DL (ref 8.5–10.1)
CHLORIDE BLD-SCNC: 105 MMOL/L (ref 94–109)
CO2 SERPL-SCNC: 25 MMOL/L (ref 20–32)
CREAT SERPL-MCNC: 0.91 MG/DL (ref 0.66–1.25)
CRP SERPL-MCNC: 15 MG/L (ref 0–8)
ERYTHROCYTE [DISTWIDTH] IN BLOOD BY AUTOMATED COUNT: 12.2 % (ref 10–15)
GFR SERPL CREATININE-BSD FRML MDRD: >90 ML/MIN/1.73M2
GLUCOSE BLD-MCNC: 96 MG/DL (ref 70–99)
HCT VFR BLD AUTO: 38.8 % (ref 40–53)
HGB BLD-MCNC: 13.1 G/DL (ref 13.3–17.7)
MCH RBC QN AUTO: 31.6 PG (ref 26.5–33)
MCHC RBC AUTO-ENTMCNC: 33.8 G/DL (ref 31.5–36.5)
MCV RBC AUTO: 94 FL (ref 78–100)
PLATELET # BLD AUTO: 291 10E3/UL (ref 150–450)
POTASSIUM BLD-SCNC: 4.2 MMOL/L (ref 3.4–5.3)
PROT SERPL-MCNC: 5.8 G/DL (ref 6.8–8.8)
RBC # BLD AUTO: 4.15 10E6/UL (ref 4.4–5.9)
SODIUM SERPL-SCNC: 137 MMOL/L (ref 133–144)
WBC # BLD AUTO: 8 10E3/UL (ref 4–11)

## 2021-12-20 PROCEDURE — 85014 HEMATOCRIT: CPT | Performed by: INTERNAL MEDICINE

## 2021-12-20 PROCEDURE — 250N000011 HC RX IP 250 OP 636

## 2021-12-20 PROCEDURE — 97530 THERAPEUTIC ACTIVITIES: CPT | Mod: GP

## 2021-12-20 PROCEDURE — 120N000005 HC R&B MS OVERFLOW UMMC

## 2021-12-20 PROCEDURE — 250N000013 HC RX MED GY IP 250 OP 250 PS 637: Performed by: STUDENT IN AN ORGANIZED HEALTH CARE EDUCATION/TRAINING PROGRAM

## 2021-12-20 PROCEDURE — 36415 COLL VENOUS BLD VENIPUNCTURE: CPT | Performed by: INTERNAL MEDICINE

## 2021-12-20 PROCEDURE — 80053 COMPREHEN METABOLIC PANEL: CPT | Performed by: INTERNAL MEDICINE

## 2021-12-20 PROCEDURE — 86140 C-REACTIVE PROTEIN: CPT | Performed by: INTERNAL MEDICINE

## 2021-12-20 PROCEDURE — 99233 SBSQ HOSP IP/OBS HIGH 50: CPT | Mod: GC | Performed by: INTERNAL MEDICINE

## 2021-12-20 PROCEDURE — 97110 THERAPEUTIC EXERCISES: CPT | Mod: GP

## 2021-12-20 PROCEDURE — 250N000013 HC RX MED GY IP 250 OP 250 PS 637: Performed by: INTERNAL MEDICINE

## 2021-12-20 RX ADMIN — ACETAMINOPHEN 325 MG: 325 TABLET, FILM COATED ORAL at 21:54

## 2021-12-20 RX ADMIN — ENOXAPARIN SODIUM 40 MG: 40 INJECTION SUBCUTANEOUS at 08:06

## 2021-12-20 RX ADMIN — PANTOPRAZOLE SODIUM 40 MG: 40 TABLET, DELAYED RELEASE ORAL at 08:06

## 2021-12-20 RX ADMIN — BARICITINIB 4 MG: 2 TABLET, FILM COATED ORAL at 08:06

## 2021-12-20 ASSESSMENT — ACTIVITIES OF DAILY LIVING (ADL)
ADLS_ACUITY_SCORE: 4

## 2021-12-20 ASSESSMENT — MIFFLIN-ST. JEOR: SCORE: 1632.38

## 2021-12-20 NOTE — PLAN OF CARE
Neuro: A&Ox4.   Cardiac: No tele. VSS.   Respiratory: Sating >92 on 4L oxiplus  GI/: Adequate urine output. BM X1  Diet/appetite: Tolerating regular diet. Eating well.  Activity:  up ab sudihr  Pain: At acceptable level on current regimen.   Skin: No new deficits noted.  LDA's: PIV x1    Plan: Continue with POC. Notify primary team with changes.

## 2021-12-20 NOTE — PROGRESS NOTES
[facility]    Progress Note - [place] 2 Service        Date of Admission:  12/8/2021    Assessment & Plan           Juan June is a 61 year old male with PMH dyslipidemia, EMILY, GERD who was admitted on 12/8/2021 acute hypoxemic respiratory failure secondary to COVID19 PNA. Started HAP treatement on 12/12 given new leukocytosis with persistent HFNC needs and inability to wean. At this time, his HFNC needs remain stable and is is otherwise hemodynamically stable.      Changes today:   - Walk test performed today by PT. Per RN, required up to 7L of O2  - Possible discharge in next 1-2 days  - Will need outpatient pulmonary rehab     #Acute Hypoxic Respiratory Failure 2/2 Covid-19 PNA:  #Concern for superimposed CAP, less likely HAP:  Admitted to [place] on 12/6 for acute hypoxia. Symptoms and positive test around 12/1 (pt unsure of date). D. Dimer 0.075 on admission. On 12/12, pt developed leukocytosis, with increased CRP. While it is possible this is progression of his Covid-19, concern for superimposed bacterial infection and given timeline, likely CAP, less likely HAP. CXR 12/12 notable for peripheral basilar predominant opacities, likely infective. Sputum culture w/ oral contaminants, UA negative. Blood cultures negative  -Covid-19 therapies:              -completed dexamethasone (12/8-17)              -c/w baricitinib (12/8-p). Last day 12/21              -Pt declined remdesivir  - DVT ppx: Lovenox qday  - For concern for CAP, less likely HAP: completed course of cefdinir + azithromycin     # Elevated liver enzymes, stable  ALT and AST elevated on admission- these have remained stable since admission. Alkaline phosphatase is normal. Pt does not endorse any abdominal pain. No hx of elevated liver enzymes. Likely 2/2 covid-19 inflammation.   - Continue to monitor  - Follow up outpatient      # GERD:  Takes omeprazole at home. Placed on protonix in ICU  -c/w  pantoprazole 40mg daily.      Diet: Regular Diet Adult    DVT Prophylaxis: Enoxaparin (Lovenox) SQ  Correa Catheter: Not present  Fluids: None  Central Lines: None  Code Status: Full Code      Disposition Plan   Expected Discharge: 12/22/2021     Anticipated discharge location:  Awaiting care coordination huddle  Delays:     Oxygen Needs            The patient's care was discussed with the Attending Physician, Dr. Middleton and Bedside Nurse.    Juan Goodman MD  [address]  Securely message with the Vocera Web Console (learn more here)  Text page via [employer] Paging/Directory    Please see sign in/sign out for up to date coverage information    Clinically Significant Risk Factors Present on Admission                ______________________________________________________________________    Interval History   No acute events overnight. Today is the best the patient has felt. He states he took off his mask to shave and he did not de-saturate in the several minutes it took. He does note that he dips a bit with exertion. No chest pain, abdominal pain, nausea, vomiting. Tolerating food.     Per RN, PT worked with Juan today and completed a walk test, requiring up to 7L O2.     Data reviewed today: I reviewed all medications, new labs and imaging results over the last 24 hours.     Physical Exam   Vital Signs: Temp: 97.7  F (36.5  C) Temp src: Oral BP: 98/57 Pulse: 69   Resp: 18 SpO2: 96 % O2 Device: Oxymask Oxygen Delivery: 4 LPM  Weight: 184 lbs 8.4 oz  Constitutional: awake, alert, cooperative, no apparent distress, and appears stated age  ENT: Facemask in place  Respiratory: No increased work of breathing, good air exchange, clear to auscultation bilaterally.   Cardiovascular: Regular rate and rhythm, normal S1 and S2, no S3 or S4, and no murmur noted. No edema noted.     Data   Recent Labs   Lab 12/20/21  0444 12/17/21  0453 12/16/21  0448 12/15/21  0529    WBC 8.0  --  10.7 10.2   HGB 13.1*  --  14.5 13.9   MCV 94  --  90 92    357 384 358     --  135 135   POTASSIUM 4.2  --  4.7 4.2   CHLORIDE 105  --  103 104   CO2 25  --  25 25   BUN 17  --  19 19   CR 0.91 0.80 0.87 0.79   ANIONGAP 7  --  7 6   AVERY 8.3*  --  8.8 8.4*   GLC 96  --  92 98   ALBUMIN 2.4*  --   --  2.5*   PROTTOTAL 5.8*  --   --  6.2*   BILITOTAL 0.8  --   --  0.6   ALKPHOS 56  --   --  52   *  --   --  75*   AST 44  --   --  34

## 2021-12-20 NOTE — PLAN OF CARE
"/68 (BP Location: Left arm)   Pulse 66   Temp 97.9  F (36.6  C) (Oral)   Resp 20   Ht 1.753 m (5' 9\")   Wt 83 kg (182 lb 15.7 oz)   SpO2 93%   BMI 27.02 kg/m       Neuro: A&O x 4  Cardiac: VSS, No tele  Respiratory: O2 sats > 92% on 4L pewr OxyPlus mask. Pt desats with activity, Able to recover quickly  GI/: Up to commode. BM today. Using urinal at bedside  Diet/Appetite: Reg diet. Appetite good  Skin: No new deficits noted  LDA: piv  Activity: Independent  Pain: Denies  Plan: Continue to wean O2, Transfer to med/surg floor. Will continue with POC.    " 1% lidocaine

## 2021-12-21 ENCOUNTER — APPOINTMENT (OUTPATIENT)
Dept: PHYSICAL THERAPY | Facility: CLINIC | Age: 61
DRG: 177 | End: 2021-12-21
Attending: ANESTHESIOLOGY
Payer: COMMERCIAL

## 2021-12-21 PROCEDURE — 97530 THERAPEUTIC ACTIVITIES: CPT | Mod: GP

## 2021-12-21 PROCEDURE — 97750 PHYSICAL PERFORMANCE TEST: CPT | Mod: GP

## 2021-12-21 PROCEDURE — 250N000013 HC RX MED GY IP 250 OP 250 PS 637: Performed by: STUDENT IN AN ORGANIZED HEALTH CARE EDUCATION/TRAINING PROGRAM

## 2021-12-21 PROCEDURE — 120N000005 HC R&B MS OVERFLOW UMMC

## 2021-12-21 PROCEDURE — 250N000013 HC RX MED GY IP 250 OP 250 PS 637: Performed by: INTERNAL MEDICINE

## 2021-12-21 PROCEDURE — 250N000011 HC RX IP 250 OP 636

## 2021-12-21 PROCEDURE — 99232 SBSQ HOSP IP/OBS MODERATE 35: CPT | Mod: GC | Performed by: INTERNAL MEDICINE

## 2021-12-21 RX ADMIN — ENOXAPARIN SODIUM 40 MG: 40 INJECTION SUBCUTANEOUS at 07:36

## 2021-12-21 RX ADMIN — PANTOPRAZOLE SODIUM 40 MG: 40 TABLET, DELAYED RELEASE ORAL at 07:35

## 2021-12-21 RX ADMIN — BARICITINIB 4 MG: 2 TABLET, FILM COATED ORAL at 08:35

## 2021-12-21 ASSESSMENT — ACTIVITIES OF DAILY LIVING (ADL)
ADLS_ACUITY_SCORE: 4

## 2021-12-21 ASSESSMENT — MIFFLIN-ST. JEOR: SCORE: 1634.38

## 2021-12-21 NOTE — PLAN OF CARE
Neuro: A&Ox4.   Cardiac: SR. VSS.   Respiratory: 2L oxymask and up to 4L with activity. Oxygen saturation >90%.   GI/: Adequate urine output. 1 BM overnight.   Diet/appetite: Regular diet.  Activity:  Independent and up ad sudhir. Commode and urinal at bedside.   Pain: Denied pain overnight.   Skin: No new deficits noted.  LDA's: PIV.    Plan: Continue with POC. Notify primary team with changes.

## 2021-12-21 NOTE — PLAN OF CARE
Patient has been assessed for Home Oxygen needs. Oxygen readings:    *Pulse oximetry (SpO2) = 90% on room air at rest while awake.    *SpO2 improved to 97% on 4 liters/minute at rest.    *SpO2 = 80% on room air during activity/with exercise.    *SpO2 improved to 93% on 4 liters/minute during activity/with exercise.    Also completed while on 2L NC, at rest SpO2 at 94%, with activity 89%.

## 2021-12-21 NOTE — PROGRESS NOTES
[facility]    Progress Note - [place] 2 Service        Date of Admission:  12/8/2021    Assessment & Plan           Juan June is a 61 year old male with PMH dyslipidemia, EMILY, GERD who was admitted on 12/8/2021 acute hypoxemic respiratory failure secondary to COVID19 PNA. Started HAP treatement on 12/12 given new leukocytosis with persistent HFNC needs and inability to wean. At this time, he has weaned to oxymask and is otherwise hemodynamically stable.      Changes today:   - Per PT, still requiring 2-4L O2 continuously  - Likely discharge tomorrow. Home O2 ordered in preparation for discharge  - Outpatient pulmonary rehab ordered for tomorrow  - Continue to wean O2 as tolerated      #Acute Hypoxic Respiratory Failure 2/2 Covid-19 PNA:  #Concern for superimposed CAP, less likely HAP:  Admitted to [place] on 12/6 for acute hypoxia. Symptoms and positive test around 12/1 (pt unsure of date). D. Dimer 0.075 on admission. On 12/12, pt developed leukocytosis, with increased CRP. While it is possible this is progression of his Covid-19, concern for superimposed bacterial infection and given timeline, likely CAP, less likely HAP. CXR 12/12 notable for peripheral basilar predominant opacities, likely infective. Sputum culture w/ oral contaminants, UA negative. Blood cultures negative  -Covid-19 therapies:              -completed dexamethasone (12/8-17)              -c/w baricitinib (12/8-p). Last day 12/21              -Pt declined remdesivir  - DVT ppx: Lovenox qday  - For concern for CAP, less likely HAP: completed course of cefdinir + azithromycin  - Home O2 will be required per PT, 2-4L continuously. Ordered for care coordinator to help set up for possible discharge tomorrow     # Elevated liver enzymes, stable  ALT and AST elevated on admission- these have remained stable since admission. Alkaline phosphatase is normal. Pt does not endorse any abdominal pain. No  hx of elevated liver enzymes. Likely 2/2 covid-19 inflammation.   - Continue to monitor  - Follow up outpatient      # GERD:  Takes omeprazole at home. Placed on protonix in ICU  -c/w pantoprazole 40mg daily.       Diet: Regular Diet Adult    DVT Prophylaxis: Enoxaparin (Lovenox) SQ  Correa Catheter: Not present  Fluids: None  Central Lines: None  Code Status: Full Code      Disposition Plan   Expected Discharge: 12/22/2021     Anticipated discharge location:  Awaiting care coordination huddle  Delays:     Oxygen Needs            The patient's care was discussed with the Attending Physician, Dr. Ruiz, Care Coordinator/ and Patient.    Juan Goodman MD  [address]  Securely message with the Vocera Web Console (learn more here)  Text page via [employer] Paging/Directory    Please see sign in/sign out for up to date coverage information    Clinically Significant Risk Factors Present on Admission                ______________________________________________________________________    Interval History   No acute events overnight. He is hesitant about leaving today but feels similarly clinically as yesterday. Denies any chest pain, abdominal pain, vomiting, cough. PT recommends home 2-4L continuous O2.     Data reviewed today: I reviewed all medications, new labs and imaging results over the last 24 hours.     Physical Exam   Vital Signs: Temp: 98.5  F (36.9  C) Temp src: Oral BP: 106/63 Pulse: 68   Resp: 20 SpO2: 92 % O2 Device: Oxymask Oxygen Delivery: 4 LPM  Weight: 184 lbs 15.46 oz  Constitutional: awake, alert, cooperative, no apparent distress, and appears stated age  ENT: Oxymask in place  Respiratory: No increased work of breathing, good air exchange, clear to auscultation bilaterally.   Cardiovascular: Regular rate and rhythm, normal S1 and S2, no S3 or S4, and no murmur noted. No edema noted.     Data   Recent Labs   Lab 12/20/21  3797  12/17/21  0453 12/16/21  0448 12/15/21  0529   WBC 8.0  --  10.7 10.2   HGB 13.1*  --  14.5 13.9   MCV 94  --  90 92    357 384 358     --  135 135   POTASSIUM 4.2  --  4.7 4.2   CHLORIDE 105  --  103 104   CO2 25  --  25 25   BUN 17  --  19 19   CR 0.91 0.80 0.87 0.79   ANIONGAP 7  --  7 6   AVERY 8.3*  --  8.8 8.4*   GLC 96  --  92 98   ALBUMIN 2.4*  --   --  2.5*   PROTTOTAL 5.8*  --   --  6.2*   BILITOTAL 0.8  --   --  0.6   ALKPHOS 56  --   --  52   *  --   --  75*   AST 44  --   --  34

## 2021-12-21 NOTE — PROGRESS NOTES
Brief Care Management Note    Length of Stay (days): 13    Expected Discharge Date: 12/22/2021     Concerns to be Addressed: Medical readiness      Patient plan of care discussed at interdisciplinary rounds: Yes    Anticipated Discharge Disposition:  Home  Anticipated Discharge Services:  OP PT  Anticipated Discharge DME:  Home O2    Referrals Placed by CM/SW:  Home O2  Private pay costs discussed: Not applicable    Additional Information:  Per PT eval, patient is needing 2-4L continuously. Home O2 order and face to face documentation requested from provider. Attempted to contact patient via hospital phone to discuss, no answer at this time. Per the primary team, hopeful to discharge patient tomorrow, will arranged for home O2 to be delivered to the hospital prior to discharge, bedside nurse updated. PT recommending OP PT, referral placed at this time. RNCC will continue to follow for discharge planning.     [facility] (2-4L O2)  Phone: [phone]    Vikki Serrano, RNCC, BSN    [facility]    [address]  [address] [place] [address]    [email or web address]    Office: [phone] Pager: [phone]  To contact the weekend RNCC, page [phone].

## 2021-12-21 NOTE — PLAN OF CARE
"/69   Pulse 76   Temp 98.5  F (36.9  C) (Oral)   Resp 18   Ht 1.753 m (5' 9\")   Wt 83.7 kg (184 lb 8.4 oz)   SpO2 91%   BMI 27.25 kg/m        Neuro: AAOx4  Cardiac: VSS  Respiratory: Adequate O2 sats on 2-3L NC or Oxymask,   GI/:  Voiding without difficulty, LBM today  Diet/appetite: Regular diet, good appetite  Activity: Up ad sudhir in room  Pain: C/O headache this evening, improved with PRN Tylenol  Skin: No new deficits  Lines/Drains: R PIV, saline locked  Plan: Hoping to discharge soon, SW working on setting up home O2     "

## 2021-12-21 NOTE — PROGRESS NOTES
I certify that this patient, Juan June has been under my care (or a nurse practitioner or physican's assistant working with me). This is the face-to-face encounter for oxygen medical necessity.      Juan June is now in a chronic stable state and continues to require supplemental oxygen. Patient has continued oxygen desaturation due to COVID-19 pneumonia.    Alternative treatment(s) tried or considered and deemed clinically infective for treatment of COVID-19 pneumonia include inhalers, pulmonary toileting and dexamethasone, barencitinib.  If portability is ordered, is the patient mobile within the home? Yes     **Patients who qualify for home O2 coverage under the CMS guidelines require ABG tests or O2 sat readings obtained closest to, but no earlier than 2 days prior to the discharge, as evidence of the need for home oxygen therapy. Testing must be performed while patient is in the chronic stable state. See notes for O2 sats.**

## 2021-12-22 VITALS
HEIGHT: 69 IN | TEMPERATURE: 98.2 F | RESPIRATION RATE: 18 BRPM | DIASTOLIC BLOOD PRESSURE: 79 MMHG | BODY MASS INDEX: 27.36 KG/M2 | WEIGHT: 184.75 LBS | HEART RATE: 66 BPM | OXYGEN SATURATION: 95 % | SYSTOLIC BLOOD PRESSURE: 115 MMHG

## 2021-12-22 PROCEDURE — 250N000013 HC RX MED GY IP 250 OP 250 PS 637: Performed by: STUDENT IN AN ORGANIZED HEALTH CARE EDUCATION/TRAINING PROGRAM

## 2021-12-22 PROCEDURE — 250N000011 HC RX IP 250 OP 636

## 2021-12-22 PROCEDURE — 99238 HOSP IP/OBS DSCHRG MGMT 30/<: CPT | Performed by: INTERNAL MEDICINE

## 2021-12-22 RX ORDER — ACETAMINOPHEN 325 MG/1
650 TABLET ORAL EVERY 4 HOURS PRN
COMMUNITY
Start: 2021-12-22

## 2021-12-22 RX ADMIN — ENOXAPARIN SODIUM 40 MG: 40 INJECTION SUBCUTANEOUS at 07:31

## 2021-12-22 RX ADMIN — PANTOPRAZOLE SODIUM 40 MG: 40 TABLET, DELAYED RELEASE ORAL at 07:31

## 2021-12-22 ASSESSMENT — ACTIVITIES OF DAILY LIVING (ADL)
ADLS_ACUITY_SCORE: 4

## 2021-12-22 ASSESSMENT — MIFFLIN-ST. JEOR: SCORE: 1633.38

## 2021-12-22 NOTE — PROGRESS NOTES
Care Management Follow Up    Length of Stay (days): 14    Expected Discharge Date: 12/22/2021     Concerns to be Addressed:       Patient plan of care discussed at interdisciplinary rounds: Yes    Anticipated Discharge Disposition:  Home  Anticipated Discharge Services:  OP PT  Anticipated Discharge DME:  Home O2    Referrals Placed by CM/SW:  Home O2  Private pay costs discussed: Not applicable    Additional Information:  Call  Home [facility] to order oxygen. Information relayed to Alexsandra at  Home medical. Alexsandra states they will call back in 30 minutes.    [facility] (2-4L O2)  Phone: [phone]    Judy Hutchison RNCC

## 2021-12-22 NOTE — PROGRESS NOTES
Care Management Discharge Note    Discharge Date: 12/22/2021    Discharge Disposition:  Home    Discharge Services:  Home     Discharge DME:  Home O2    Discharge Transportation: Family/friend     Private pay costs discussed: Not applicable    Education Provided on the Discharge Plan:  Yes  Persons Notified of Discharge Plans: Patient, [facility]  Patient/Family in Agreement with the Plan:  Yes    Handoff Referral Completed: No    Additional Information:  Home O2 order updated to reflect length of need. [facility] updated, home O2 will be delivered to the hospital within the next 1-2hrs, bedside nurse updated. No additional discharge needs noted.     [facility] (2-4L O2)  Phone: [phone]     Vikki Serrano, RNCC, BSN    [facility]    [address]  [address] [place] [address]    [email or web address]    Office: [phone] Pager: [phone]  To contact the weekend RNCC, page [phone].

## 2021-12-22 NOTE — PLAN OF CARE
Neuro: A&Ox4.   Cardiac: No tele orders. VSS.   Respiratory: 2LPM oxymask overnight. Oxygen saturation >92% overnight. SHAH & desats to 80% with activity requiring 4-5LPM. Pt states bumping oxygen prior to activity appears to help with SOB.        GI/: Adequate urine output. BM x3 overnight.   Diet/appetite: Regular diet.  Activity:  Independent in room. Bedside commode and urinal within reach.   Pain: Denied of any pain overnight.   Skin: No new deficits noted.  LDA's: PIV.    Plan: Continue with POC. Notify primary team with changes.

## 2021-12-22 NOTE — DISCHARGE SUMMARY
[facility]  Discharge Summary - Medicine & Pediatrics       Date of Admission:  12/8/2021  Date of Discharge:  12/22/2021  Discharging Provider: Macho Ruiz MD  Discharge Service: Werner 2    Discharge Diagnoses   Acute Hypoxic Respiratory Failure 2/2 Covid-19 PNA  Concern for superimposed CAP, less likely HAP  Elevated liver enzymes, stable  GERD    Follow-ups Needed After Discharge   Follow-up Appointments     Adult [facility] Follow-up and recommended labs and tests      Follow up with primary care provider, at [facility], within   7-14 days for hospital follow- up and assessment of respiratory status and   need for continued home oxygen.     Follow up with     Appointments on [place] and/or [facility] (with [facility] or [facility]   provider or service). Call [phone] if you haven't heard regarding   these appointments within 7 days of discharge.             Discharge Disposition   Discharged to home  Condition at discharge: Stable    Hospital Course   Juan June is a 61 year old male with PMH dyslipidemia, EMILY, GERD who was admitted on 12/8/2021 acute hypoxemic respiratory failure secondary to COVID19 PNA.     Acute Hypoxic Respiratory Failure 2/2 Covid-19 PNA:  Concern for superimposed CAP, less likely HAP:  Admitted to [place] on 12/6 for acute hypoxia. Previously tested positive for COVID-19. Symptoms and positive test around 12/1. D-Dimer 0.075 on admission. On 12/12, pt developed leukocytosis, with increased CRP. While it is possible this is progression of his Covid-19, concern for superimposed bacterial infection and given timeline, likely CAP, less likely HAP. CXR 12/12 notable for peripheral basilar predominant opacities, likely infective. Sputum culture w/ oral contaminants, UA negative. Blood cultures negative. Completed course of cefdinir + azithromycin  -Covid-19 therapies:              -completed dexamethasone  (12/8-17)              -c/w baricitinib (12/8-p). Last day 12/21              -Pt declined remdesivir  - He was slow to wean from oxygen. Continued to require 2-4 L by NC or facemask. Up to 6L with activity. Home O2 provided with instructions to attempt to wean off at home as tolerated.      Elevated liver enzymes, stable  ALT and AST elevated on admission- these have remained stable since admission. Alkaline phosphatase is normal. Pt does not endorse any abdominal pain. No hx of elevated liver enzymes. Likely 2/2 covid-19 inflammation. Most recently  and AST 44 on 12/20. Repeat as outpatient.      GERD:  Resume omeprazole on discharge.        Consultations This Hospital Stay   PHYSICAL THERAPY ADULT IP CONSULT  OCCUPATIONAL THERAPY ADULT IP CONSULT  PHARMACY TO DOSE VANCO    Code Status   Full Code       Time spent on patient: 20 minutes total.    Please feel free to contact me with any questions at the number listed below.    Christopher Ruiz MD  Med-Peds Hospitalist  Cell: [phone]  Pager: [phone]          ______________________________________________________________________    Physical Exam   Vital Signs: Temp: 98.2  F (36.8  C) Temp src: Oral BP: 115/79 Pulse: 66   Resp: 18 SpO2: 95 % O2 Device: Oxymask Oxygen Delivery: 2 LPM  Weight: 184 lbs 11.93 oz  Constitutional: awake, alert, cooperative, no apparent distress, and appears stated age  ENT: Oxymask in place  Respiratory: No increased work of breathing, good air exchange, clear to auscultation bilaterally.   Cardiovascular: Regular rate and rhythm, normal S1 and S2, no S3 or S4, and no murmur noted. No edema noted.      Primary Care Physician   [facility] Izquierdo    Discharge Orders      Pulmonary Rehab Referral      COVID-19 GetWell Loop Referral      Reason for your hospital stay    You were admitted to the hospital for COVID-19 infection     Contact provider    Contact your primary care provider if If increased trouble breathing, new arm/leg  swelling, dizziness/passing out, falls, bleeding that doesn't stop, or uncontrolled pain.     Adult [facility] Follow-up and recommended labs and tests    Follow up with primary care provider, at [facility], within 7-14 days for hospital follow- up and assessment of respiratory status and need for continued home oxygen.     Follow up with     Appointments on [place] and/or [facility] (with [facility] or [facility] provider or service). Call [phone] if you haven't heard regarding these appointments within 7 days of discharge.     Discharge - Quarantine/Isolation Instruction    Date of symptom onset:   11/28/21  Date of first positive test:  11/30/21  Stay home and away from others (self-isolate) for at least 20 days from when your symptoms started And...   You've had no fevers and no medicine that reduces fever for 1 full day (24 hours)  And...   Your other symptoms have resolved (gotten better).  Your quarantine period is now over at the time of discharge     Activity    Your activity upon discharge: activity as tolerated     Monitor and record    Oxygen level     Oxygen Adult/Peds    Oxygen Documentation:   I certify that this patient, Juan June has been under my care (or a nurse practitioner or physican's assistant working with me). This is the face-to-face encounter for oxygen medical necessity.      Juan June is now in a chronic stable state and continues to require supplemental oxygen. Patient has continued oxygen desaturation due to COVID-19 pneumonia.    Alternative treatment(s) tried or considered and deemed clinically infective for treatment of COVID-19 pneumonia include inhalers, steroids, and pulmonary toileting.  If portability is ordered, is the patient mobile within the home? yes    **Patients who qualify for home O2 coverage under the CMS guidelines require ABG tests or O2 sat readings obtained closest to, but no earlier than 2 days prior to the discharge, as evidence of the need  "for home oxygen therapy. Testing must be performed while patient is in the chronic stable state. See notes for O2 sats.**     Diet    Follow this diet upon discharge: Orders Placed This Encounter      Regular Diet Adult       Significant Results and Procedures   Results for orders placed or performed during the hospital encounter of 12/08/21   XR Chest Port 1 View    Narrative    EXAM: XR CHEST PORT 1 VIEW  12/12/2021 11:38 AM     HISTORY:  Leukocytosis, concern for superimposed bacterial infection        COMPARISON:  12/6/2021    FINDINGS: Frontal chest x-ray. The trachea is midline. The  cardiomediastinal silhouette is within normal limits. Peripheral  basilar predominant airspace opacities. No pleural effusion or  pneumothorax. No osseous abnormalities.      Impression    IMPRESSION: Peripheral basilar predominant pulmonary opacities likely  infective.    I have personally reviewed the examination and initial interpretation  and I agree with the findings.    BRENDA HAWKINS MD         SYSTEM ID:  [id number]       Discharge Medications   Current Discharge Medication List      START taking these medications    Details   acetaminophen (TYLENOL) 325 MG tablet Take 2 tablets (650 mg) by mouth every 4 hours as needed for mild pain or fever    Associated Diagnoses: Acute respiratory failure with hypoxia (H)         CONTINUE these medications which have NOT CHANGED    Details   omeprazole (PRILOSEC) 20 MG DR capsule Take 20 mg by mouth daily           Allergies   Allergies   Allergen Reactions     Penicillin V Unknown     Tolerated cefepime and cefdinir (both Dec 2021)     Penicillins      \"?\" as child     "

## 2021-12-22 NOTE — PLAN OF CARE
Physical Therapy Discharge Summary    Reason for therapy discharge:    Discharged to home with outpatient therapy.    Progress towards therapy goal(s). See goals on Care Plan in [facility] electronic health record for goal details.  Goals partially met.  Barriers to achieving goals:   discharge from facility.    Therapy recommendation(s):    Continued therapy is recommended.  Rationale/Recommendations:  OP MA for progression of strength/aerobic endurance.

## 2021-12-22 NOTE — DISCHARGE INSTRUCTIONS
Oxygen Provider:  Arranged through [employer], contact number [phone].  If you have any questions or concerns please call the oxygen company directly.

## 2022-01-26 ENCOUNTER — NURSE TRIAGE (OUTPATIENT)
Dept: NURSING | Facility: CLINIC | Age: 62
End: 2022-01-26
Payer: COMMERCIAL

## 2022-01-26 NOTE — TELEPHONE ENCOUNTER
Pt was discharged from [facility] 12/22/2021 and was sent home with oxygen     Pt is wondering about when he can discontinue Oxygen use - O2sats are running 95-97% on room air - pt is using supplemental Oxygen half a liter at HS. Pt states that his O2sats will drop into the low 80's when he walks around at times throughout the day.    Pt has an appointment with a Pulmonary MD on Thursday and advised that they are most qualified to work with pt on weaning off Oxygen     No Triage     Yareli Farias RN  [place] Nurse Advisor  12:53 AM 1/26/2022      COVID 19 Nurse Triage Plan/Patient Instructions    Please be aware that novel coronavirus (COVID-19) may be circulating in the community. If you develop symptoms such as fever, cough, or SOB or if you have concerns about the presence of another infection including coronavirus (COVID-19), please contact your health care provider or visit [email or web address].     Disposition/Instructions    Home care recommended. Follow home care protocol based instructions.    Thank you for taking steps to prevent the spread of this virus.  o Limit your contact with others.  o Wear a simple mask to cover your cough.  o Wash your hands well and often.    Resources    [facility]: About COVID-19: [email or web address]    CDC: What to Do If You're Sick: www.cdc.gov/coronavirus/2019-ncov/about/steps-when-sick.html    CDC: Ending Home Isolation: www.cdc.gov/coronavirus/2019-ncov/hcp/disposition-in-home-patients.html     CDC: Caring for Someone: www.cdc.gov/coronavirus/2019-ncov/if-you-are-sick/care-for-someone.html     [facility]: Interim Guidance for Hospital Discharge to Home: www.health.[facility].[place]/diseases/coronavirus/hcp/hospdischarge.pdf    [facility] clinical trials (COVID-19 research studies): clinicalaffairs.[employer].[facility]/umn-clinical-trials     Below are the COVID-19 hotlines at the [place] Department of Health ([facility]). Interpreters are available.   o For  health questions: Call 693-352-7049 or 1-238.826.4376 (7 a.m. to 7 p.m.)  o For questions about schools and childcare: Call 623-639-3983 or 1-174.570.8841 (7 a.m. to 7 p.m.)                             Reason for Disposition    Health Information question, no triage required and triager able to answer question    Additional Information    Negative: [1] Caller is not with the adult (patient) AND [2] reporting urgent symptoms    Negative: Lab result questions    Negative: Medication questions    Negative: Caller can't be reached by phone    Negative: Caller has already spoken to PCP or another triager    Negative: RN needs further essential information from caller in order to complete triage    Negative: Requesting regular office appointment    Negative: [1] Caller requesting NON-URGENT health information AND [2] PCP's office is the best resource    Protocols used: INFORMATION ONLY CALL - NO TRIAGE-A-

## 2022-01-27 ENCOUNTER — HOSPITAL ENCOUNTER (OUTPATIENT)
Dept: CARDIAC REHAB | Facility: HOSPITAL | Age: 62
End: 2022-01-27
Payer: COMMERCIAL

## 2022-01-27 DIAGNOSIS — J96.01 ACUTE RESPIRATORY FAILURE WITH HYPOXIA (H): ICD-10-CM

## 2022-01-27 DIAGNOSIS — U07.1 INFECTION DUE TO 2019 NOVEL CORONAVIRUS: ICD-10-CM

## 2022-01-27 PROCEDURE — 94626 PHY/QHP OP PULM RHB W/MNTR: CPT

## 2022-02-01 ENCOUNTER — HOSPITAL ENCOUNTER (OUTPATIENT)
Dept: CARDIAC REHAB | Facility: HOSPITAL | Age: 62
End: 2022-02-01
Attending: INTERNAL MEDICINE
Payer: COMMERCIAL

## 2022-02-01 PROCEDURE — 94625 PHY/QHP OP PULM RHB W/O MNTR: CPT

## 2022-02-03 ENCOUNTER — HOSPITAL ENCOUNTER (OUTPATIENT)
Dept: CARDIAC REHAB | Facility: HOSPITAL | Age: 62
End: 2022-02-03
Attending: INTERNAL MEDICINE
Payer: COMMERCIAL

## 2022-02-03 PROCEDURE — 94625 PHY/QHP OP PULM RHB W/O MNTR: CPT

## 2022-02-05 ENCOUNTER — HEALTH MAINTENANCE LETTER (OUTPATIENT)
Age: 62
End: 2022-02-05

## 2022-02-07 ENCOUNTER — LAB REQUISITION (OUTPATIENT)
Dept: LAB | Facility: CLINIC | Age: 62
End: 2022-02-07

## 2022-02-07 DIAGNOSIS — E78.2 MIXED HYPERLIPIDEMIA: ICD-10-CM

## 2022-02-07 LAB
ALBUMIN SERPL-MCNC: 4.1 G/DL (ref 3.5–5)
ALP SERPL-CCNC: 71 U/L (ref 45–120)
ALT SERPL W P-5'-P-CCNC: 69 U/L (ref 0–45)
ANION GAP SERPL CALCULATED.3IONS-SCNC: 12 MMOL/L (ref 5–18)
AST SERPL W P-5'-P-CCNC: 34 U/L (ref 0–40)
BILIRUB SERPL-MCNC: 1 MG/DL (ref 0–1)
BUN SERPL-MCNC: 11 MG/DL (ref 8–22)
CALCIUM SERPL-MCNC: 9.1 MG/DL (ref 8.5–10.5)
CHLORIDE BLD-SCNC: 108 MMOL/L (ref 98–107)
CHOLEST SERPL-MCNC: 200 MG/DL
CO2 SERPL-SCNC: 21 MMOL/L (ref 22–31)
CREAT SERPL-MCNC: 0.85 MG/DL (ref 0.7–1.3)
GFR SERPL CREATININE-BSD FRML MDRD: >90 ML/MIN/1.73M2
GLUCOSE BLD-MCNC: 72 MG/DL (ref 70–125)
HDLC SERPL-MCNC: 39 MG/DL
LDLC SERPL CALC-MCNC: 119 MG/DL
POTASSIUM BLD-SCNC: 3.8 MMOL/L (ref 3.5–5)
PROT SERPL-MCNC: 6.7 G/DL (ref 6–8)
SODIUM SERPL-SCNC: 141 MMOL/L (ref 136–145)
TRIGL SERPL-MCNC: 208 MG/DL

## 2022-02-07 PROCEDURE — 80053 COMPREHEN METABOLIC PANEL: CPT | Performed by: FAMILY MEDICINE

## 2022-02-07 PROCEDURE — 80061 LIPID PANEL: CPT | Performed by: FAMILY MEDICINE

## 2022-02-07 PROCEDURE — 82040 ASSAY OF SERUM ALBUMIN: CPT | Performed by: FAMILY MEDICINE

## 2022-02-08 ENCOUNTER — HOSPITAL ENCOUNTER (OUTPATIENT)
Dept: CARDIAC REHAB | Facility: HOSPITAL | Age: 62
End: 2022-02-08
Attending: INTERNAL MEDICINE
Payer: COMMERCIAL

## 2022-02-08 PROCEDURE — 94625 PHY/QHP OP PULM RHB W/O MNTR: CPT

## 2022-02-10 ENCOUNTER — HOSPITAL ENCOUNTER (OUTPATIENT)
Dept: CARDIAC REHAB | Facility: HOSPITAL | Age: 62
End: 2022-02-10
Attending: INTERNAL MEDICINE
Payer: COMMERCIAL

## 2022-02-10 PROCEDURE — 94625 PHY/QHP OP PULM RHB W/O MNTR: CPT

## 2022-02-15 ENCOUNTER — HOSPITAL ENCOUNTER (OUTPATIENT)
Dept: CARDIAC REHAB | Facility: HOSPITAL | Age: 62
End: 2022-02-15
Attending: INTERNAL MEDICINE
Payer: COMMERCIAL

## 2022-02-15 PROCEDURE — 94625 PHY/QHP OP PULM RHB W/O MNTR: CPT

## 2022-02-17 ENCOUNTER — HOSPITAL ENCOUNTER (OUTPATIENT)
Dept: CARDIAC REHAB | Facility: HOSPITAL | Age: 62
End: 2022-02-17
Attending: INTERNAL MEDICINE
Payer: COMMERCIAL

## 2022-02-17 PROCEDURE — 94625 PHY/QHP OP PULM RHB W/O MNTR: CPT

## 2022-02-22 ENCOUNTER — HOSPITAL ENCOUNTER (OUTPATIENT)
Dept: CARDIAC REHAB | Facility: HOSPITAL | Age: 62
End: 2022-02-22
Attending: INTERNAL MEDICINE
Payer: COMMERCIAL

## 2022-02-22 PROCEDURE — 94625 PHY/QHP OP PULM RHB W/O MNTR: CPT

## 2022-02-24 ENCOUNTER — HOSPITAL ENCOUNTER (OUTPATIENT)
Dept: CARDIAC REHAB | Facility: HOSPITAL | Age: 62
End: 2022-02-24
Attending: INTERNAL MEDICINE
Payer: COMMERCIAL

## 2022-02-24 PROCEDURE — 94625 PHY/QHP OP PULM RHB W/O MNTR: CPT

## 2022-09-25 ENCOUNTER — HEALTH MAINTENANCE LETTER (OUTPATIENT)
Age: 62
End: 2022-09-25

## 2023-05-13 ENCOUNTER — HEALTH MAINTENANCE LETTER (OUTPATIENT)
Age: 63
End: 2023-05-13

## 2023-06-22 ENCOUNTER — LAB REQUISITION (OUTPATIENT)
Dept: LAB | Facility: CLINIC | Age: 63
End: 2023-06-22

## 2023-06-22 DIAGNOSIS — E78.2 MIXED HYPERLIPIDEMIA: ICD-10-CM

## 2023-06-22 LAB
ALBUMIN SERPL BCG-MCNC: 4.5 G/DL (ref 3.5–5.2)
ALP SERPL-CCNC: 91 U/L (ref 40–129)
ALT SERPL W P-5'-P-CCNC: 46 U/L (ref 0–70)
ANION GAP SERPL CALCULATED.3IONS-SCNC: 14 MMOL/L (ref 7–15)
AST SERPL W P-5'-P-CCNC: 47 U/L (ref 0–45)
BILIRUB SERPL-MCNC: 0.7 MG/DL
BUN SERPL-MCNC: 17 MG/DL (ref 8–23)
CALCIUM SERPL-MCNC: 9 MG/DL (ref 8.8–10.2)
CHLORIDE SERPL-SCNC: 107 MMOL/L (ref 98–107)
CHOLEST SERPL-MCNC: 200 MG/DL
CREAT SERPL-MCNC: 1.05 MG/DL (ref 0.67–1.17)
DEPRECATED HCO3 PLAS-SCNC: 21 MMOL/L (ref 22–29)
GFR SERPL CREATININE-BSD FRML MDRD: 80 ML/MIN/1.73M2
GLUCOSE SERPL-MCNC: 93 MG/DL (ref 70–99)
HDLC SERPL-MCNC: 39 MG/DL
LDLC SERPL CALC-MCNC: 118 MG/DL
NONHDLC SERPL-MCNC: 161 MG/DL
POTASSIUM SERPL-SCNC: 4.1 MMOL/L (ref 3.4–5.3)
PROT SERPL-MCNC: 6.7 G/DL (ref 6.4–8.3)
SODIUM SERPL-SCNC: 142 MMOL/L (ref 136–145)
TRIGL SERPL-MCNC: 216 MG/DL

## 2023-06-22 PROCEDURE — 80061 LIPID PANEL: CPT | Performed by: FAMILY MEDICINE

## 2023-06-22 PROCEDURE — 80053 COMPREHEN METABOLIC PANEL: CPT | Performed by: FAMILY MEDICINE

## 2024-03-21 ENCOUNTER — HOSPITAL ENCOUNTER (EMERGENCY)
Facility: HOSPITAL | Age: 64
Discharge: HOME | End: 2024-03-21
Attending: EMERGENCY MEDICINE | Admitting: EMERGENCY MEDICINE
Payer: COMMERCIAL

## 2024-03-21 ENCOUNTER — APPOINTMENT (OUTPATIENT)
Dept: RADIOLOGY | Facility: HOSPITAL | Age: 64
End: 2024-03-21
Attending: EMERGENCY MEDICINE
Payer: COMMERCIAL

## 2024-03-21 ENCOUNTER — APPOINTMENT (OUTPATIENT)
Dept: CT IMAGING | Facility: HOSPITAL | Age: 64
End: 2024-03-21
Attending: EMERGENCY MEDICINE
Payer: COMMERCIAL

## 2024-03-21 VITALS
SYSTOLIC BLOOD PRESSURE: 145 MMHG | OXYGEN SATURATION: 95 % | RESPIRATION RATE: 20 BRPM | DIASTOLIC BLOOD PRESSURE: 84 MMHG | BODY MASS INDEX: 31.1 KG/M2 | TEMPERATURE: 97.9 F | HEART RATE: 59 BPM | WEIGHT: 210 LBS | HEIGHT: 69 IN

## 2024-03-21 DIAGNOSIS — G47.00 INSOMNIA, UNSPECIFIED TYPE: ICD-10-CM

## 2024-03-21 DIAGNOSIS — R07.9 INTERMITTENT CHEST PAIN: ICD-10-CM

## 2024-03-21 LAB
ANION GAP SERPL CALCULATED.3IONS-SCNC: 13 MMOL/L (ref 7–15)
BUN SERPL-MCNC: 18.3 MG/DL (ref 8–23)
CALCIUM SERPL-MCNC: 9.1 MG/DL (ref 8.8–10.2)
CHLORIDE SERPL-SCNC: 104 MMOL/L (ref 98–107)
CREAT SERPL-MCNC: 1.07 MG/DL (ref 0.67–1.17)
DEPRECATED HCO3 PLAS-SCNC: 22 MMOL/L (ref 22–29)
EGFRCR SERPLBLD CKD-EPI 2021: 78 ML/MIN/1.73M2
ERYTHROCYTE [DISTWIDTH] IN BLOOD BY AUTOMATED COUNT: 11.7 % (ref 10–15)
GLUCOSE SERPL-MCNC: 96 MG/DL (ref 70–99)
HCT VFR BLD AUTO: 45.1 % (ref 40–53)
HGB BLD-MCNC: 16 G/DL (ref 13.3–17.7)
HOLD SPECIMEN: NORMAL
HOLD SPECIMEN: NORMAL
MCH RBC QN AUTO: 31.7 PG (ref 26.5–33)
MCHC RBC AUTO-ENTMCNC: 35.5 G/DL (ref 31.5–36.5)
MCV RBC AUTO: 90 FL (ref 78–100)
PLATELET # BLD AUTO: 230 10E3/UL (ref 150–450)
POTASSIUM SERPL-SCNC: 3.9 MMOL/L (ref 3.4–5.3)
RBC # BLD AUTO: 5.04 10E6/UL (ref 4.4–5.9)
SODIUM SERPL-SCNC: 139 MMOL/L (ref 135–145)
TROPONIN T SERPL HS-MCNC: <6 NG/L
WBC # BLD AUTO: 6.7 10E3/UL (ref 4–11)

## 2024-03-21 PROCEDURE — 80048 BASIC METABOLIC PNL TOTAL CA: CPT | Performed by: STUDENT IN AN ORGANIZED HEALTH CARE EDUCATION/TRAINING PROGRAM

## 2024-03-21 PROCEDURE — 80048 BASIC METABOLIC PNL TOTAL CA: CPT | Performed by: EMERGENCY MEDICINE

## 2024-03-21 PROCEDURE — 85027 COMPLETE CBC AUTOMATED: CPT | Performed by: EMERGENCY MEDICINE

## 2024-03-21 PROCEDURE — 84484 ASSAY OF TROPONIN QUANT: CPT | Performed by: EMERGENCY MEDICINE

## 2024-03-21 PROCEDURE — 85027 COMPLETE CBC AUTOMATED: CPT | Performed by: STUDENT IN AN ORGANIZED HEALTH CARE EDUCATION/TRAINING PROGRAM

## 2024-03-21 PROCEDURE — 71045 X-RAY EXAM CHEST 1 VIEW: CPT

## 2024-03-21 PROCEDURE — 99285 EMERGENCY DEPT VISIT HI MDM: CPT | Mod: 25

## 2024-03-21 PROCEDURE — 93005 ELECTROCARDIOGRAM TRACING: CPT | Performed by: EMERGENCY MEDICINE

## 2024-03-21 PROCEDURE — 36415 COLL VENOUS BLD VENIPUNCTURE: CPT | Performed by: STUDENT IN AN ORGANIZED HEALTH CARE EDUCATION/TRAINING PROGRAM

## 2024-03-21 PROCEDURE — 70450 CT HEAD/BRAIN W/O DYE: CPT

## 2024-03-21 ASSESSMENT — COLUMBIA-SUICIDE SEVERITY RATING SCALE - C-SSRS
2. HAVE YOU ACTUALLY HAD ANY THOUGHTS OF KILLING YOURSELF IN THE PAST MONTH?: NO
1. IN THE PAST MONTH, HAVE YOU WISHED YOU WERE DEAD OR WISHED YOU COULD GO TO SLEEP AND NOT WAKE UP?: NO
6. HAVE YOU EVER DONE ANYTHING, STARTED TO DO ANYTHING, OR PREPARED TO DO ANYTHING TO END YOUR LIFE?: NO

## 2024-03-21 ASSESSMENT — ACTIVITIES OF DAILY LIVING (ADL)
ADLS_ACUITY_SCORE: 37
ADLS_ACUITY_SCORE: 37

## 2024-03-22 LAB
ATRIAL RATE - MUSE: 61 BPM
DIASTOLIC BLOOD PRESSURE - MUSE: NORMAL MMHG
INTERPRETATION ECG - MUSE: NORMAL
P AXIS - MUSE: 30 DEGREES
PR INTERVAL - MUSE: 166 MS
QRS DURATION - MUSE: 98 MS
QT - MUSE: 430 MS
QTC - MUSE: 432 MS
R AXIS - MUSE: 20 DEGREES
SYSTOLIC BLOOD PRESSURE - MUSE: NORMAL MMHG
T AXIS - MUSE: -10 DEGREES
VENTRICULAR RATE- MUSE: 61 BPM

## 2024-03-22 NOTE — ED TRIAGE NOTES
Pt states having intermittent episodes of confusion.  Pt states insomnia over the past 2 weeks.  Pt denies any current chest pain but has been worked up for chest pain recenlty and is currently being monitored with mini guardian.  Pt states bouts of anxiety currently as well.

## 2024-03-22 NOTE — DISCHARGE INSTRUCTIONS
Encourage rest and fluids.  Baby aspirin 81 mg daily.  Have your doctor refer you to cardiology and follow-up with cardiology in the next week or 2.  See your doctor return if worse or problems.  Benadryl over-the-counter 25 mg tablets can be taken at bedtime to help with sleep.  Do not drive with this.

## 2024-03-22 NOTE — ED PROVIDER NOTES
EMERGENCY DEPARTMENT ENCOUNTER      NAME: Juan June  AGE: 63 year old male  YOB: 1960  MRN: [id number]  EVALUATION DATE & TIME: 3/21/2024  9:13 PM    PCP: [facility] Izquierdo    ED PROVIDER: Juan Tang M.D.      Chief Complaint   Patient presents with    Insomnia    Altered Mental Status         FINAL IMPRESSION:  1.  Intermittent chest pain.  2.  Insomnia.      ED COURSE & MEDICAL DECISION MAKIN:40 PM.  I met with the patient to gather history and to perform my initial exam. We discussed plans for the ED course, including diagnostic testing and treatment. PPE worn: cloth mask.  Patient with intermittent chest pain for the last 3 weeks.  The last was yesterday after minimal activity and climbing 1 flight of stairs.  Symptoms resolved after few minutes with time.  He describes it as a chest pressure-like sensation in the central chest.  Patient has seen his doctor and they have some sort of event monitor looking for arrhythmia on the patient at this time.  It has not yet been read and patient has not seen cardiology.  He notes insomnia recently.  He worried that maybe he is having a stroke given generalized fatigue symptoms.  11:29 PM.  Chemistries negative.  CBC negative.  EKG unremarkable.  Troponin negative.  Chest x-ray negative.  Head CT negative.  Patient will be discharged to home.  Patient given referral to cardiology and instructed as to a baby aspirin daily.  Patient in agreement discharge to home.    Pertinent Labs & Imaging studies reviewed. (See chart for details)  63 year old male presents to the Emergency Department for evaluation of intermittent chest pain.    At the conclusion of the encounter I discussed the results of all of the tests and the disposition. The questions were answered. The patient or family acknowledged understanding and was agreeable with the care plan.              Medical Decision Making  Obtained supplemental history:Supplemental history obtained?:  No  Reviewed external records: Both inpatient and outpatient [facility] records were reviewed.  Care impacted by chronic illness: GERD, sleep apnea  Care significantly affected by social determinants of health:Access to Medical Care  Did you consider but not order tests?: Work up considered but not performed and documented in chart, if applicable  Did you interpret images independently?: Independent interpretation of ECG and images noted in documentation, when applicable.  Consultation discussion with other provider: If admitted, will discuss care management with the hospitalist service.  Anticipate probable discharge home after evaluation with referral to cardiology.      MEDICATIONS GIVEN IN THE EMERGENCY:  Medications - No data to display    NEW PRESCRIPTIONS STARTED AT TODAY'S ER VISIT  New Prescriptions    No medications on file          =================================================================    HPI    Patient information was obtained from: The patient.    Use of : N/A        Juan June is a 63 year old male with a pertinent history of GERD and sleep apnea who presents to this ED today for evaluation of intermittent chest pain or chest pressure over the last 3 weeks.  Last episode was yesterday.  His doctor has placed an event monitor to record for arrhythmia and symptoms.  No chest pain today.  Yesterday pain minimal activity and 1 flight of stairs.  He notes insomnia and poor sleep.  He notes his current CPAP machine does not help him with his sleep apnea and he has a follow-up appoint with pulmonary to get a better fit or mask.    He does not identify any waxing or waning symptoms otherwise, exacerbating or alleviating features, associated symptoms except as mentioned.  He denies any pain related complaints.    REVIEW OF SYSTEMS   Review of Systems complaining of intermittent chest pain, insomnia, fatigue.    PAST MEDICAL HISTORY:  No past medical history on file.    PAST SURGICAL  "HISTORY:  No past surgical history on file.        CURRENT MEDICATIONS:    acetaminophen (TYLENOL) 325 MG tablet  omeprazole (PRILOSEC) 20 MG DR capsule        ALLERGIES:  Allergies   Allergen Reactions    Penicillin V Unknown     Tolerated cefepime and cefdinir (both Dec 2021)    Penicillins      \"?\" as child       FAMILY HISTORY:  Family History   Problem Relation Age of Onset    Hypertension Mother         born 1937    Lipids Father         born 1933    Family History Negative Brother        SOCIAL HISTORY:   Social History     Socioeconomic History    Marital status: Single   Tobacco Use    Smoking status: Former     Types: Cigarettes     Quit date: 1978     Years since quittin.9   Substance and Sexual Activity    Alcohol use: Yes     Comment: rare   Rare alcohol.  Former smoker.  No current drugs or tobacco.    VITALS:  /79   Pulse 61   Temp 97.9  F (36.6  C) (Oral)   Resp 20   Ht 1.753 m (5' 9\")   Wt 95.3 kg (210 lb)   SpO2 96%   BMI 31.01 kg/m      PHYSICAL EXAM    Vital Signs:  /79   Pulse 61   Temp 97.9  F (36.6  C) (Oral)   Resp 20   Ht 1.753 m (5' 9\")   Wt 95.3 kg (210 lb)   SpO2 96%   BMI 31.01 kg/m    General:  On entering the room he is in no apparent distress.    Neck:  Neck supple with full range of motion and nontender.    Back:  Back and spine are nontender.  No costovertebral angle tenderness.    HEENT:  Oropharynx clear with moist mucous membranes.  HEENT unremarkable.    Pulmonary:  Chest clear to auscultation without rhonchi rales or wheezing.    Cardiovascular:  Cardiac regular rate and rhythm without murmurs rubs or gallops.    Abdomen:  Abdomen soft nontender.  There is no rebound or guarding.    Muskuloskeletal:  He moves all 4 without any difficulty and has normal neurovascular exams.  Extremities without clubbing, cyanosis, or edema.  Legs and calves are nontender.    Neuro:  He is alert and oriented ×3 and moves all extremities symmetrically.  Strength " 5/5 in all 4 extremities.  Sensation intact in all 4 extremities.  Visual fields intact to confrontation.  Cerebellar function with finger-nose testing good and equal bilaterally.  Cranial nerves II through XII intact and equal bilaterally.  Psych:  Normal affect.    Skin:  Unremarkable and warm and dry.       LAB:  All pertinent labs reviewed and interpreted.  Labs Ordered and Resulted from Time of ED Arrival to Time of ED Departure   BASIC METABOLIC PANEL - Normal       Result Value    Sodium 139      Potassium 3.9      Chloride 104      Carbon Dioxide (CO2) 22      Anion Gap 13      Urea Nitrogen 18.3      Creatinine 1.07      GFR Estimate 78      Calcium 9.1      Glucose 96     CBC WITH PLATELETS - Normal    WBC Count 6.7      RBC Count 5.04      Hemoglobin 16.0      Hematocrit 45.1      MCV 90      MCH 31.7      MCHC 35.5      RDW 11.7      Platelet Count 230     TROPONIN T, HIGH SENSITIVITY - Normal    Troponin T, High Sensitivity <6         RADIOLOGY:  Reviewed all pertinent imaging. Please see official radiology report.  XR Chest 1 View   Final Result   IMPRESSION:       No focal airspace disease. No pleural effusion or pneumothorax.      The cardiomediastinal silhouette is unremarkable.      Head CT w/o contrast   Final Result   IMPRESSION:   1.  No acute intracranial abnormality or significant change compared to the prior study.                 EKG:    Normal sinus rhythm 61, nonspecific ST segment changes.  No previous for comparison.    I have independently reviewed and interpreted the EKG(s) documented above.    PROCEDURES:         I, Ashley Kraus, am serving as a scribe to document services personally performed by Dr. Tang based on my observation and the provider's statements to me. I, Juan Tang MD attest that Ashley Kraus is acting in a scribe capacity, has observed my performance of the services and has documented them in accordance with my direction.    Juan Tang M.D.  Emergency  Medicine  [facility] EMERGENCY DEPARTMENT  [address] [place] [id number]  [phone]  Dept: [phone]      Juan Tang MD  03/21/24 6520

## 2024-07-20 ENCOUNTER — HEALTH MAINTENANCE LETTER (OUTPATIENT)
Age: 64
End: 2024-07-20

## 2025-08-09 ENCOUNTER — HEALTH MAINTENANCE LETTER (OUTPATIENT)
Age: 65
End: 2025-08-09